# Patient Record
Sex: MALE | Race: WHITE | NOT HISPANIC OR LATINO | Employment: FULL TIME | URBAN - METROPOLITAN AREA
[De-identification: names, ages, dates, MRNs, and addresses within clinical notes are randomized per-mention and may not be internally consistent; named-entity substitution may affect disease eponyms.]

---

## 2023-10-25 ENCOUNTER — OFFICE VISIT (OUTPATIENT)
Dept: PRIMARY CARE | Facility: CLINIC | Age: 58
End: 2023-10-25
Payer: COMMERCIAL

## 2023-10-25 VITALS
BODY MASS INDEX: 30.32 KG/M2 | OXYGEN SATURATION: 98 % | SYSTOLIC BLOOD PRESSURE: 124 MMHG | HEART RATE: 91 BPM | DIASTOLIC BLOOD PRESSURE: 80 MMHG | HEIGHT: 65 IN | TEMPERATURE: 97.9 F | WEIGHT: 182 LBS

## 2023-10-25 DIAGNOSIS — E66.09 CLASS 1 OBESITY DUE TO EXCESS CALORIES WITHOUT SERIOUS COMORBIDITY WITH BODY MASS INDEX (BMI) OF 30.0 TO 30.9 IN ADULT: ICD-10-CM

## 2023-10-25 DIAGNOSIS — N40.1 BPH ASSOCIATED WITH NOCTURIA: ICD-10-CM

## 2023-10-25 DIAGNOSIS — R73.03 PREDIABETES: ICD-10-CM

## 2023-10-25 DIAGNOSIS — R35.1 BPH ASSOCIATED WITH NOCTURIA: ICD-10-CM

## 2023-10-25 DIAGNOSIS — R79.89 LOW TESTOSTERONE IN MALE: ICD-10-CM

## 2023-10-25 DIAGNOSIS — Z00.00 ENCOUNTER FOR WELLNESS EXAMINATION: Primary | ICD-10-CM

## 2023-10-25 PROBLEM — F43.22 ADJUSTMENT DISORDER WITH ANXIETY: Status: ACTIVE | Noted: 2023-10-25

## 2023-10-25 PROBLEM — E66.811 OBESITY, CLASS I, BMI 30-34.9: Status: ACTIVE | Noted: 2018-09-07

## 2023-10-25 PROBLEM — E66.9 OBESITY, CLASS I, BMI 30-34.9: Status: ACTIVE | Noted: 2018-09-07

## 2023-10-25 PROBLEM — G47.33 OBSTRUCTIVE SLEEP APNEA: Status: ACTIVE | Noted: 2023-10-25

## 2023-10-25 PROCEDURE — 99396 PREV VISIT EST AGE 40-64: CPT | Performed by: INTERNAL MEDICINE

## 2023-10-25 PROCEDURE — 3008F BODY MASS INDEX DOCD: CPT | Performed by: INTERNAL MEDICINE

## 2023-10-25 RX ORDER — DULOXETIN HYDROCHLORIDE 30 MG/1
60 CAPSULE, DELAYED RELEASE ORAL EVERY MORNING
COMMUNITY
Start: 2023-10-08 | End: 2024-02-27 | Stop reason: WASHOUT

## 2023-10-25 RX ORDER — TAMSULOSIN HYDROCHLORIDE 0.4 MG/1
CAPSULE ORAL
COMMUNITY
Start: 2017-03-21 | End: 2024-02-27 | Stop reason: SDUPTHER

## 2023-10-25 RX ORDER — DEXTROAMPHETAMINE SACCHARATE, AMPHETAMINE ASPARTATE MONOHYDRATE, DEXTROAMPHETAMINE SULFATE AND AMPHETAMINE SULFATE 5; 5; 5; 5 MG/1; MG/1; MG/1; MG/1
CAPSULE, EXTENDED RELEASE ORAL
COMMUNITY
Start: 2023-10-04

## 2023-10-25 NOTE — PROGRESS NOTES
"Subjective   Yonis Murrell is a 58 y.o. male who presents for Annual Exam.  HPI    Continues on tamsulosin, typically up once per night; STABLE, and satisfactory.      Obstructive sleep apnea, Dr. Murtaza Ladd, now on CPAP, has significant improvement with his mood, his energy during the day, wakes up feeling more rested. Been using CPAP regularly since September 2015.     Unfortunately, father (who had PD) fell down the stairs and suffered multiple rib fractures, succumbing to his injuries in AUG 2023.  Going through grief.  Also daughter is in [place] adding to stressors.     , 3 kids doing well.   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  Oldest daughter in [place], second daughter  and living in [place], son going to [employer] [facility].  No EtOH, tobacco.     Providers:  GI-Dr. Frankel  Psychiatry-Dr. Jeremias Rodriguez  Dermatology-Dr. Vieyra, not recently   Objective   /80   Pulse 91   Temp 36.6 °C (97.9 °F)   Ht 1.651 m (5' 5\")   Wt 82.6 kg (182 lb)   SpO2 98%   BMI 30.29 kg/m²    Physical Exam  Gen: NAD, pleasant, A&;Ox3  HEENT: PERRL, EOMI, MMM, OP clear  Neck: supple, no thyromegaly, no JVD, normal carotid upstroke  Pulm: lungs CTAB, good air movement  CV: RRR, no m/r/g, 2+ DP pulses  Abd: NABS, soft, NT, ND no HSM  Ext: no peripheral edema  Neuro: CN II-XII intact, no focal sensory or motor deficits, normal reflexes    Assessment/Plan     CV:  -EKG is reassuring, low LDL measured 7/28/2022, no concerning family or personal history  -continue working on diet and exercise     Psych: anxiety and depression  - seeing Dr Jeremias Rodriguez, psychiatry  - seeing Sukhdev Owen, therapy  - planning on CBT-[facility] therapy at [facility]  - med rec updated     MALA/History of hypertension: Off meds, no hypertension since being on CPAP; using regularly, helps with sleep quality and energy, continue     BPH: cont Flomax, return to Urology prn; low T on recent labs, recheck; no symptoms of decreased libido or ED     Health maintenance  - recheck " metabolics, has IFG  -Last colonoscopy: 12/3/2020, normal; repeat 5 years (hx of polyps)  - PSA: 3.75ng/mL JUL 2022  -Smoking history: Never  -Counseled regarding diet and exercise, patient has relatively good at home, discussed portion control, increase exercise  -Immunizations: current  -Followup in one year or as needed  Problem List Items Addressed This Visit    None           Rocky Orozco MD

## 2023-11-03 ENCOUNTER — LAB (OUTPATIENT)
Dept: LAB | Facility: LAB | Age: 58
End: 2023-11-03
Payer: COMMERCIAL

## 2023-11-03 DIAGNOSIS — Z00.00 ENCOUNTER FOR WELLNESS EXAMINATION: ICD-10-CM

## 2023-11-03 DIAGNOSIS — R73.03 PREDIABETES: ICD-10-CM

## 2023-11-03 DIAGNOSIS — R35.1 BPH ASSOCIATED WITH NOCTURIA: ICD-10-CM

## 2023-11-03 DIAGNOSIS — R79.89 LOW TESTOSTERONE IN MALE: ICD-10-CM

## 2023-11-03 DIAGNOSIS — N40.1 BPH ASSOCIATED WITH NOCTURIA: ICD-10-CM

## 2023-11-03 LAB
ALBUMIN SERPL BCP-MCNC: 4.2 G/DL (ref 3.4–5)
ALP SERPL-CCNC: 96 U/L (ref 33–120)
ALT SERPL W P-5'-P-CCNC: 41 U/L (ref 10–52)
ANION GAP SERPL CALC-SCNC: 11 MMOL/L (ref 10–20)
AST SERPL W P-5'-P-CCNC: 29 U/L (ref 9–39)
BILIRUB SERPL-MCNC: 1.1 MG/DL (ref 0–1.2)
BUN SERPL-MCNC: 13 MG/DL (ref 6–23)
CALCIUM SERPL-MCNC: 9.5 MG/DL (ref 8.6–10.6)
CHLORIDE SERPL-SCNC: 102 MMOL/L (ref 98–107)
CHOLEST SERPL-MCNC: 127 MG/DL (ref 0–199)
CHOLESTEROL/HDL RATIO: 4.5
CO2 SERPL-SCNC: 29 MMOL/L (ref 21–32)
CREAT SERPL-MCNC: 0.68 MG/DL (ref 0.5–1.3)
ERYTHROCYTE [DISTWIDTH] IN BLOOD BY AUTOMATED COUNT: 12.9 % (ref 11.5–14.5)
EST. AVERAGE GLUCOSE BLD GHB EST-MCNC: 197 MG/DL
GFR SERPL CREATININE-BSD FRML MDRD: >90 ML/MIN/1.73M*2
GLUCOSE SERPL-MCNC: 234 MG/DL (ref 74–99)
HBA1C MFR BLD: 8.5 %
HCT VFR BLD AUTO: 42.3 % (ref 41–52)
HDLC SERPL-MCNC: 28.1 MG/DL
HGB BLD-MCNC: 15 G/DL (ref 13.5–17.5)
LDLC SERPL CALC-MCNC: 57 MG/DL
MCH RBC QN AUTO: 29.2 PG (ref 26–34)
MCHC RBC AUTO-ENTMCNC: 35.5 G/DL (ref 32–36)
MCV RBC AUTO: 83 FL (ref 80–100)
NON HDL CHOLESTEROL: 99 MG/DL (ref 0–149)
NRBC BLD-RTO: 0 /100 WBCS (ref 0–0)
PLATELET # BLD AUTO: 196 X10*3/UL (ref 150–450)
POTASSIUM SERPL-SCNC: 4 MMOL/L (ref 3.5–5.3)
PROT SERPL-MCNC: 6.7 G/DL (ref 6.4–8.2)
PSA SERPL-MCNC: 3.17 NG/ML
RBC # BLD AUTO: 5.13 X10*6/UL (ref 4.5–5.9)
SODIUM SERPL-SCNC: 138 MMOL/L (ref 136–145)
TESTOST SERPL-MCNC: 246 NG/DL (ref 240–1000)
TRIGL SERPL-MCNC: 208 MG/DL (ref 0–149)
VLDL: 42 MG/DL (ref 0–40)
WBC # BLD AUTO: 6.5 X10*3/UL (ref 4.4–11.3)

## 2023-11-03 PROCEDURE — 36415 COLL VENOUS BLD VENIPUNCTURE: CPT

## 2023-11-03 PROCEDURE — 80061 LIPID PANEL: CPT

## 2023-11-03 PROCEDURE — 84153 ASSAY OF PSA TOTAL: CPT

## 2023-11-03 PROCEDURE — 83036 HEMOGLOBIN GLYCOSYLATED A1C: CPT

## 2023-11-03 PROCEDURE — 84403 ASSAY OF TOTAL TESTOSTERONE: CPT

## 2023-11-03 PROCEDURE — 85027 COMPLETE CBC AUTOMATED: CPT

## 2023-11-03 PROCEDURE — 80053 COMPREHEN METABOLIC PANEL: CPT

## 2023-12-07 ENCOUNTER — HOSPITAL ENCOUNTER (OUTPATIENT)
Dept: RADIOLOGY | Facility: HOSPITAL | Age: 58
Discharge: HOME | End: 2023-12-07
Payer: COMMERCIAL

## 2023-12-07 ENCOUNTER — OFFICE VISIT (OUTPATIENT)
Dept: ORTHOPEDIC SURGERY | Facility: HOSPITAL | Age: 58
End: 2023-12-07
Payer: COMMERCIAL

## 2023-12-07 ENCOUNTER — APPOINTMENT (OUTPATIENT)
Dept: SPORTS MEDICINE | Facility: HOSPITAL | Age: 58
End: 2023-12-07
Payer: COMMERCIAL

## 2023-12-07 DIAGNOSIS — M79.641 RIGHT HAND PAIN: ICD-10-CM

## 2023-12-07 DIAGNOSIS — M77.8 TENDINITIS OF EXTENSOR TENDON OF RIGHT HAND: ICD-10-CM

## 2023-12-07 PROCEDURE — 73130 X-RAY EXAM OF HAND: CPT | Mod: RT

## 2023-12-07 PROCEDURE — 99214 OFFICE O/P EST MOD 30 MIN: CPT | Performed by: EMERGENCY MEDICINE

## 2023-12-07 PROCEDURE — 73130 X-RAY EXAM OF HAND: CPT | Mod: RIGHT SIDE | Performed by: RADIOLOGY

## 2023-12-07 PROCEDURE — 99204 OFFICE O/P NEW MOD 45 MIN: CPT | Performed by: EMERGENCY MEDICINE

## 2023-12-07 PROCEDURE — 3008F BODY MASS INDEX DOCD: CPT | Performed by: EMERGENCY MEDICINE

## 2023-12-07 RX ORDER — MELOXICAM 7.5 MG/1
7.5 TABLET ORAL 2 TIMES DAILY
Qty: 28 TABLET | Refills: 0 | Status: SHIPPED | OUTPATIENT
Start: 2023-12-07 | End: 2023-12-21

## 2023-12-07 NOTE — PROGRESS NOTES
History of Present Illness:  Encounter date: 12/07/2023  Yonis Murrell is a 58 y.o. RHD male who presents today in injury clinic for evaluation of right hand pain. He states his hand was accidentally shut in a house door on 12/1/2023 and states he had subsequent pain and swelling. He applied ice and rested with improvement of swelling and pain, then noticed pain worsening with work as he is frequently typing. States pain is over dorsal aspect of third MCP of right hand and worse with finger extension and . Denies any redness, warmth, radiation of pain, numbness, tingling, weakness, or catching/locking. Denies previous injury to this area. Denies other concerns at this time.    Review of Systems  Constitutional: no fever, no chills, not feeling tired, no recent weight gain and no recent weight loss.   ENT: no nosebleeds.   Cardiovascular: no chest pain.   Respiratory: no shortness of breath and no cough.   Gastrointestinal: no abdominal pain, no nausea, no diarrhea and no vomiting.   Musculoskeletal: as noted in HPI and no arthralgias.   Integumentary: no rashes and no skin wound.   Neurological: no headache.   Psychiatric: no sleep disturbances and no depression.   Endocrine: no muscle weakness and no muscle cramps.   Hematologic/Lymphatic: no swollen glands and no tendency for easy bruising.    Physical Exam:  RIGHT HAND EXAM    Inspection:  Swelling: mild over dorsal third MCP  Effusion: none  Deformity rotational phalanges/metacarpals: none  Deformity angular phalanges/metacarpals: none  Thenar atrophy: none  Hypothernar atrophy: none    ROM:  PIP joints: full, pain free   DIP joints: full, pain free   MCP joints: full, pain free   IP joint thumb: full, pain free     Palpation:  TTP Distal phalanges No  TTP Middle phalanges No  TTP Proximal phalanges No  TTP Metacarpals No  TTP Scaphoid No  TTP Lunate No  TTP PIP joints No  TTP DIP joints No  TTP MCP joints No  TTP IP joint thumb No    TTP Extensor tendons  wrist No  TTP Flexor tendons of wrist No    Strength  Flexion PIPs pain free, 5/5  Flexion DIPs pain free, 5/5   Flexion MCPs pain free  5/5 in digits 1-2 and 4-5,. Third MCP with mild pain, 5/5 strength.  Flexion thumb IP pain free, 5/5    Extension PIPs pain free, 5/5 in digits 1-2 and 4-5. Third PIP with pain and 4/5 strength.  Extension DIPs pain free, 5/5   Extension MCPs pain free, 5/5 in digits 1-2 and 4-5. Third MCP with pain and 4/5 strength.  Extension thumb IP pain free, 5/5    Can do “OK” sign  Thumb Extension 5/5  Interosseous muscle testing pain free, 5/5  Wrist extension pain free, 5/5  Wrist flexion pain free, 5/5  Pronation forearm pain free, 5/5  Supination forearm 5/5, no pain     Ligament and special testing:   Collateral ligament testing: No Pain/laxity with PIP, DIP collateral ligament of fingers  Jerson's maneuver (extension PIP joint against resistance): negative    Constitutional: General appearance = Alert and in no acute distress. Well developed, well nourished.   Head and face: Normal.    Eyes: External Eye, Conjunctiva and Lids=Normal external exam; and extraocular movements intact (EOMI).   Ears, Nose, Mouth, and Throat: External inspection of ears and nose=Normal.   Hearing= Normal.    Neck: No neck mass was observed.  Pulmonary: Respiratory effort = No respiratory distress.   Cardiovascular: Examination of extremities= No peripheral edema.   Skin and subcutaneous tissue: Normal skin color and pigmentation, normal skin turgor, and no rash.    Neurologic: Sensation= Normal.    Psychiatric: Judgment and insight= Intact.  Orientation to person, place, and time: Alert and oriented x 3.  Mood and affect= Normal.    Imaging:   Radiographs of the right hand obtained today were reviewed and revealed no acute osseous abnormalities.  The studies were reviewed with Dr. Hidalgo personally in the office today.    Problem List Items Addressed This Visit    None  Visit Diagnoses         Codes    Right  hand pain     M79.641    Relevant Orders    XR hand right 3+ views (Completed)    Tendinitis of extensor tendon of right hand     M77.8    Relevant Medications    meloxicam (Mobic) 7.5 mg tablet    Other Relevant Orders    Referral to Occupational Therapy        Patient Discussion/Summary  We reviewed the exam and x-ray findings and discussed the conservative and surgical treatment options. We agreed to conservative management of right hand, third digit extensor tendinitis. Prescribed meloxicam 7.5 mg to be taken twice daily for 14 days. OT referral also provided for further strengthening/ROM exercises of right hand. May use buddy tape as needed. Activity modification discussed and encouraged to continue ice and rest as needed. May follow-up in clinic in 2-4 weeks if no improvement or sooner as needed. Discussed this plan with patient who is understanding and agreeable.    **This note was dictated using Dragon speech recognition software and was not corrected for spelling or grammatical errors**

## 2023-12-22 ENCOUNTER — OFFICE VISIT (OUTPATIENT)
Dept: PRIMARY CARE | Facility: CLINIC | Age: 58
End: 2023-12-22
Payer: COMMERCIAL

## 2023-12-22 VITALS
TEMPERATURE: 97.3 F | BODY MASS INDEX: 29.62 KG/M2 | WEIGHT: 178 LBS | DIASTOLIC BLOOD PRESSURE: 73 MMHG | HEART RATE: 73 BPM | OXYGEN SATURATION: 97 % | SYSTOLIC BLOOD PRESSURE: 123 MMHG

## 2023-12-22 DIAGNOSIS — E11.9 TYPE 2 DIABETES MELLITUS WITHOUT COMPLICATION, WITHOUT LONG-TERM CURRENT USE OF INSULIN (MULTI): Primary | ICD-10-CM

## 2023-12-22 DIAGNOSIS — R79.89 LOW TESTOSTERONE IN MALE: ICD-10-CM

## 2023-12-22 PROBLEM — R73.03 PREDIABETES: Status: RESOLVED | Noted: 2023-10-25 | Resolved: 2023-12-22

## 2023-12-22 PROBLEM — E66.811 CLASS 1 OBESITY DUE TO EXCESS CALORIES WITHOUT SERIOUS COMORBIDITY WITH BODY MASS INDEX (BMI) OF 30.0 TO 30.9 IN ADULT: Status: RESOLVED | Noted: 2018-09-07 | Resolved: 2023-12-22

## 2023-12-22 PROBLEM — E66.09 CLASS 1 OBESITY DUE TO EXCESS CALORIES WITHOUT SERIOUS COMORBIDITY WITH BODY MASS INDEX (BMI) OF 30.0 TO 30.9 IN ADULT: Status: RESOLVED | Noted: 2018-09-07 | Resolved: 2023-12-22

## 2023-12-22 PROCEDURE — 3078F DIAST BP <80 MM HG: CPT | Performed by: INTERNAL MEDICINE

## 2023-12-22 PROCEDURE — 3074F SYST BP LT 130 MM HG: CPT | Performed by: INTERNAL MEDICINE

## 2023-12-22 PROCEDURE — 99215 OFFICE O/P EST HI 40 MIN: CPT | Performed by: INTERNAL MEDICINE

## 2023-12-22 PROCEDURE — 95251 CONT GLUC MNTR ANALYSIS I&R: CPT | Performed by: INTERNAL MEDICINE

## 2023-12-22 PROCEDURE — 3008F BODY MASS INDEX DOCD: CPT | Performed by: INTERNAL MEDICINE

## 2023-12-22 PROCEDURE — 3048F LDL-C <100 MG/DL: CPT | Performed by: INTERNAL MEDICINE

## 2023-12-22 PROCEDURE — 3052F HG A1C>EQUAL 8.0%<EQUAL 9.0%: CPT | Performed by: INTERNAL MEDICINE

## 2023-12-22 RX ORDER — BLOOD-GLUCOSE SENSOR
EACH MISCELLANEOUS
Qty: 1 EACH | Refills: 0 | COMMUNITY
Start: 2023-12-22 | End: 2024-01-03 | Stop reason: SDUPTHER

## 2023-12-22 NOTE — PROGRESS NOTES
Subjective   Yonis Murrell is a 58 y.o. male who presents for Follow-up.  HPI    Past medical history includes diabetes, anxiety/adjustment disorder and MALA.    He is here accompanied by his wife to discuss his recent diabetes diagnosis.  They also both recently had COVID, recovering.    Previously had prediabetes, on lab work in early November his A1c showed overt diabetes with a hemoglobin A1c of 8.5%.  Recall he was going through a lot of stressors and grief after the death of his father, eating and exercising more far from ideal.  He has started limiting carbohydrates and wants to work on lifestyle modifications first prior to moving to medications for diabetes.    He has no vision changes or blurriness, no numbness or tingling in the extremities, no polyuria or polydipsia.    Extensive discussion about pathogenesis and natural history of diabetes and approaches to diet and exercise to help limit progression or reverse the process.  We also discussed monitoring with a CGM.    He also had borderline low testosterone and was concerned about relationship to poor energy and mood, discussed this with his psychiatrist as well.    **COPIED FORWARD FOR REFERENCE**    Continues on tamsulosin, typically up once per night; STABLE, and satisfactory.      Obstructive sleep apnea, Dr. Murtaza Ladd, now on CPAP, has significant improvement with his mood, his energy during the day, wakes up feeling more rested. Been using CPAP regularly since September 2015.      , 3 kids doing well.   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  Oldest daughter in [place], second daughter  and living in [facility], son going to [facility] [employer].  No EtOH, tobacco.     Providers:  GI-Dr. Frankel  Psychiatry-Dr. Jeremias Rodriguez  Dermatology-Dr. Vieyra, not recently   Objective   /73   Pulse 73   Temp 36.3 °C (97.3 °F)   Wt 80.7 kg (178 lb)   SpO2 97%   BMI 29.62 kg/m²    Physical Exam  Gen: NAD, pleasant, A&;Ox3  HEENT: PERRL, EOMI, MMM, OP clear  Neck:  "supple, no thyromegaly, no JVD, normal carotid upstroke  Pulm: lungs CTAB, good air movement  CV: RRR, no m/r/g, 2+ DP pulses  Abd: NABS, soft, NT, ND no HSM  Ext: no peripheral edema  Neuro: CN II-XII intact, no focal sensory or motor deficits, normal reflexes    Lab Results   Component Value Date    HGBA1C 8.5 (H) 11/03/2023      No results found for: \"ALBUR\", \"[id number]\"   Lab Results   Component Value Date    CHOL 127 11/03/2023     Lab Results   Component Value Date    HDL 28.1 11/03/2023     Lab Results   Component Value Date    LDLCALC 57 11/03/2023     Lab Results   Component Value Date    TRIG 208 (H) 11/03/2023     No components found for: \"CHOLHDL\"  Lab Results   Component Value Date    CREATININE 0.68 11/03/2023          Assessment/Plan     NIDDM: Hemoglobin A1c 8.5%, 11/3/2023  -Prefers attempt at lifestyle modifications which I am completely in support of  -Extensive discussion as above and referral to RD  -CGM sample was provided and helped apply and initiate, Lib[employer] invitation sent and will be monitored  - start statin if remains in diabetic range  - follow-up in 2-3 months    Low testosterone:  discussed relationship to weight and metabolism as well; since I would anyway confirm with a repeat test and recommend working on lifestyle modifications, we will have him do both when we recheck A1c in a few months    **COPIED FORWARD FOR REFERENCE**  CV:  -EKG is reassuring, low LDL measured 7/28/2022, no concerning family or personal history  -continue working on diet and exercise  -Discuss statin treatment given diabetes    Psych: anxiety and depression  - seeing Dr Jeremias Rodriguez, psychiatry  - seeing Sukhdev Owen, therapy  - planning on CBT[facility] therapy at [facility]  - med rec updated     MALA/History of hypertension: Off meds, no hypertension since being on CPAP; using regularly, helps with sleep quality and energy, continue     BPH: cont Flomax, return to Urology prn; low T on recent labs, recheck; no " symptoms of decreased libido or ED     Health maintenance  - recheck metabolics, has IFG  -Last colonoscopy: 12/3/2020, normal; repeat 5 years (hx of polyps)  - PSA: 3.75ng/mL JUL 2022  -Smoking history: Never  -Counseled regarding diet and exercise, patient has relatively good at home, discussed portion control, increase exercise  -Immunizations: current  -Followup in one year or as needed  Problem List Items Addressed This Visit    None  Visit Diagnoses       Type 2 diabetes mellitus without complication, without long-term current use of insulin (CMS/[facility])    -  Primary    Relevant Orders    Referral to Nutrition Services                   Rocky Orozco MD

## 2024-01-03 DIAGNOSIS — E11.9 TYPE 2 DIABETES MELLITUS WITHOUT COMPLICATION, WITHOUT LONG-TERM CURRENT USE OF INSULIN (MULTI): ICD-10-CM

## 2024-01-03 RX ORDER — BLOOD-GLUCOSE SENSOR
EACH MISCELLANEOUS
Qty: 4 EACH | Refills: 3 | Status: SHIPPED | OUTPATIENT
Start: 2024-01-03 | End: 2024-05-29 | Stop reason: SDUPTHER

## 2024-01-22 ENCOUNTER — PATIENT MESSAGE (OUTPATIENT)
Dept: PRIMARY CARE | Facility: CLINIC | Age: 59
End: 2024-01-22
Payer: COMMERCIAL

## 2024-01-22 DIAGNOSIS — E11.9 TYPE 2 DIABETES MELLITUS WITHOUT COMPLICATION, WITHOUT LONG-TERM CURRENT USE OF INSULIN (MULTI): Primary | ICD-10-CM

## 2024-01-23 ENCOUNTER — APPOINTMENT (OUTPATIENT)
Dept: PRIMARY CARE | Facility: CLINIC | Age: 59
End: 2024-01-23
Payer: COMMERCIAL

## 2024-01-23 ENCOUNTER — TELEMEDICINE (OUTPATIENT)
Dept: PRIMARY CARE | Facility: CLINIC | Age: 59
End: 2024-01-23
Payer: COMMERCIAL

## 2024-01-23 DIAGNOSIS — J40 BRONCHITIS: Primary | ICD-10-CM

## 2024-01-23 DIAGNOSIS — R05.1 ACUTE COUGH: ICD-10-CM

## 2024-01-23 PROCEDURE — 99202 OFFICE O/P NEW SF 15 MIN: CPT | Performed by: NURSE PRACTITIONER

## 2024-01-23 RX ORDER — METHYLPREDNISOLONE 4 MG/1
TABLET ORAL
Qty: 21 TABLET | Refills: 0 | Status: SHIPPED | OUTPATIENT
Start: 2024-01-23 | End: 2024-01-30

## 2024-01-23 RX ORDER — METFORMIN HYDROCHLORIDE 500 MG/1
500 TABLET, EXTENDED RELEASE ORAL
Qty: 60 TABLET | Refills: 1 | Status: SHIPPED | OUTPATIENT
Start: 2024-01-23 | End: 2024-02-15

## 2024-01-23 RX ORDER — BENZONATATE 200 MG/1
200 CAPSULE ORAL 3 TIMES DAILY PRN
Qty: 42 CAPSULE | Refills: 0 | Status: SHIPPED | OUTPATIENT
Start: 2024-01-23 | End: 2024-02-22

## 2024-01-23 ASSESSMENT — ENCOUNTER SYMPTOMS: COUGH: 1

## 2024-01-23 NOTE — PROGRESS NOTES
Subjective   Patient ID: Yonis Murrell is a 58 y.o. male who presents for Cough and Chest Pain.    Cough  This is a new problem. The current episode started in the past 7 days. The problem has been gradually worsening. The problem occurs every few minutes. The cough is Productive of sputum. Associated symptoms include postnasal drip. Associated symptoms comments: Chest feels tight. The symptoms are aggravated by cold air. Treatments tried: otc cold remedy. The treatment provided no relief.    Reports unable to get oob Saturday due to cough, feeling generally run down. Back to work today however cough continues, noted high pitch on virtual visit, chest feels tight although denies chopra or sob, no history of asthma or COPD, endorses apnea. Cough is keeping him awake at night  Does not appear toxic    Review of Systems   HENT:  Positive for postnasal drip.    Respiratory:  Positive for cough.        Objective   There were no vitals taken for this visit.    Physical Exam  Not performed virtual visit    Assessment/Plan   Problem List Items Addressed This Visit             ICD-10-CM    Bronchitis - Primary J40    Relevant Medications    benzonatate (Tessalon) 200 mg capsule    methylPREDNISolone (Medrol Dospak) 4 mg tablets    Acute cough R05.1    Relevant Medications    benzonatate (Tessalon) 200 mg capsule    methylPREDNISolone (Medrol Dospak) 4 mg tablets

## 2024-01-26 ENCOUNTER — OFFICE VISIT (OUTPATIENT)
Dept: PRIMARY CARE | Facility: CLINIC | Age: 59
End: 2024-01-26
Payer: COMMERCIAL

## 2024-01-26 VITALS
DIASTOLIC BLOOD PRESSURE: 72 MMHG | TEMPERATURE: 98.2 F | WEIGHT: 174 LBS | HEART RATE: 87 BPM | HEIGHT: 65 IN | OXYGEN SATURATION: 96 % | BODY MASS INDEX: 28.99 KG/M2 | SYSTOLIC BLOOD PRESSURE: 120 MMHG

## 2024-01-26 DIAGNOSIS — H66.002 NON-RECURRENT ACUTE SUPPURATIVE OTITIS MEDIA OF LEFT EAR WITHOUT SPONTANEOUS RUPTURE OF TYMPANIC MEMBRANE: ICD-10-CM

## 2024-01-26 DIAGNOSIS — J06.9 VIRAL UPPER RESPIRATORY TRACT INFECTION: Primary | ICD-10-CM

## 2024-01-26 PROCEDURE — 99214 OFFICE O/P EST MOD 30 MIN: CPT | Performed by: INTERNAL MEDICINE

## 2024-01-26 PROCEDURE — 3008F BODY MASS INDEX DOCD: CPT | Performed by: INTERNAL MEDICINE

## 2024-01-26 RX ORDER — AMOXICILLIN AND CLAVULANATE POTASSIUM 875; 125 MG/1; MG/1
875 TABLET, FILM COATED ORAL 2 TIMES DAILY
Qty: 10 TABLET | Refills: 0 | Status: SHIPPED | OUTPATIENT
Start: 2024-01-26 | End: 2024-01-31

## 2024-01-26 NOTE — PATIENT INSTRUCTIONS
Twice daily, in each nostril, spray 1-2 doses of Afrin (oxymetazoline) followed by nasal sinus wash (squeeze bottle or Ly pot, i.e. NeilMed) followed by 1 spray in each nostril of nasal inhaled corticosteroid (i.e. Flonase, Nasacort, fluticasone, mometasone etc.)  After the first 3 days, STOP Afrin and continue the sinus wash and nasal steroid for at least 1-2 weeks and until symptoms are improved.     Mucinex/Robitussin/Corcidin (regular or DM) for cough.

## 2024-01-26 NOTE — PROGRESS NOTES
"Subjective   Yonis Murrell is a 58 y.o. male who presents for Cough.  HPI  Started about 10 days ago, initially with a mild cough, scratchy throat, nonproductive but progressed to increasing cough with increasing sputum production mostly in the throat, cough described as a \"trip\".  Tried NyQuil with no benefit.  Called telemedicine CNP on 1/23/2024 who prescribed methylprednisolone and benzonatate.  No significant benefit, did notice increased phlegm production at first followed by overall decreased phlegm.  Blood sugars shot up significantly after starting the steroid.  He is concerned because his cough seems to be slightly lower in the chest and he is also now having sinus pressure and ear pressure and increased congestion in general as well as headaches.  Also not sleeping well.  No fevers chills or sweats  He is experiencing headaches  No shortness of breath but has some difficulty taking deep breaths.    Objective   /72   Pulse 87   Temp 36.8 °C (98.2 °F)   Ht 1.651 m (5' 5\")   Wt 78.9 kg (174 lb)   SpO2 96%   BMI 28.96 kg/m²    Physical Exam  NAD, frequent nonproductive moist cough  Moist mucous membranes, there is a slight lesion overlying the uvula  Otic canals are clear bilaterally, left tympanic membrane is erythematous without bulging, intact and without suppurative exudate  No lymphadenopathy  Lungs are clear to auscultation, some scattered rales which clear with cough  Regular rate and rhythm    Assessment/Plan     Likely viral URI with persistent cough due to postnasal drip.  Appears to possibly be developing a secondary left otitis media.  Almost completed with taper of Solu-Medrol which I would not use in the future unless absolutely necessary, questionable benefit in this case.  Stop benzonatate, no benefit in the setting of mucus production.  Prescribe Augmentin for otitis media although he may be able to clear before he gets worse with a aggressive nasal/sinus regimen as per patient " instructions.  Problem List Items Addressed This Visit    None           Rocky Orozco MD

## 2024-02-02 ENCOUNTER — LAB (OUTPATIENT)
Dept: LAB | Facility: LAB | Age: 59
End: 2024-02-02
Payer: COMMERCIAL

## 2024-02-02 DIAGNOSIS — F41.1 GENERALIZED ANXIETY DISORDER: ICD-10-CM

## 2024-02-02 DIAGNOSIS — Z79.899 OTHER LONG TERM (CURRENT) DRUG THERAPY: Primary | ICD-10-CM

## 2024-02-02 DIAGNOSIS — F90.0 ATTENTION-DEFICIT HYPERACTIVITY DISORDER, PREDOMINANTLY INATTENTIVE TYPE: ICD-10-CM

## 2024-02-02 DIAGNOSIS — F33.1 MAJOR DEPRESSIVE DISORDER, RECURRENT, MODERATE (MULTI): ICD-10-CM

## 2024-02-02 LAB
ANION GAP SERPL CALC-SCNC: 14 MMOL/L (ref 10–20)
BUN SERPL-MCNC: 16 MG/DL (ref 6–23)
CALCIUM SERPL-MCNC: 9.2 MG/DL (ref 8.6–10.3)
CHLORIDE SERPL-SCNC: 102 MMOL/L (ref 98–107)
CO2 SERPL-SCNC: 26 MMOL/L (ref 21–32)
CREAT SERPL-MCNC: 0.68 MG/DL (ref 0.5–1.3)
EGFRCR SERPLBLD CKD-EPI 2021: >90 ML/MIN/1.73M*2
GLUCOSE SERPL-MCNC: 207 MG/DL (ref 74–99)
POTASSIUM SERPL-SCNC: 3.7 MMOL/L (ref 3.5–5.3)
SODIUM SERPL-SCNC: 138 MMOL/L (ref 136–145)
TSH SERPL DL<=0.05 MIU/L-ACNC: 1.01 MIU/L (ref 0.27–4.2)

## 2024-02-02 PROCEDURE — 84443 ASSAY THYROID STIM HORMONE: CPT

## 2024-02-02 PROCEDURE — 80048 BASIC METABOLIC PNL TOTAL CA: CPT

## 2024-02-02 PROCEDURE — 36415 COLL VENOUS BLD VENIPUNCTURE: CPT

## 2024-02-10 ENCOUNTER — LAB (OUTPATIENT)
Dept: LAB | Facility: LAB | Age: 59
End: 2024-02-10
Payer: COMMERCIAL

## 2024-02-10 DIAGNOSIS — Z79.899 OTHER LONG TERM (CURRENT) DRUG THERAPY: Primary | ICD-10-CM

## 2024-02-10 LAB
ANION GAP SERPL CALC-SCNC: 12 MMOL/L (ref 10–20)
BUN SERPL-MCNC: 15 MG/DL (ref 6–23)
CALCIUM SERPL-MCNC: 9.2 MG/DL (ref 8.6–10.3)
CHLORIDE SERPL-SCNC: 104 MMOL/L (ref 98–107)
CO2 SERPL-SCNC: 26 MMOL/L (ref 21–32)
CREAT SERPL-MCNC: 0.71 MG/DL (ref 0.5–1.3)
EGFRCR SERPLBLD CKD-EPI 2021: >90 ML/MIN/1.73M*2
GLUCOSE SERPL-MCNC: 134 MG/DL (ref 74–99)
LITHIUM SERPL-SCNC: 0.16 MMOL/L (ref 0.6–1.2)
POTASSIUM SERPL-SCNC: 3.9 MMOL/L (ref 3.5–5.3)
SODIUM SERPL-SCNC: 138 MMOL/L (ref 136–145)
TSH SERPL-ACNC: 1.69 MIU/L (ref 0.44–3.98)

## 2024-02-10 PROCEDURE — 80178 ASSAY OF LITHIUM: CPT

## 2024-02-10 PROCEDURE — 36415 COLL VENOUS BLD VENIPUNCTURE: CPT

## 2024-02-10 PROCEDURE — 80048 BASIC METABOLIC PNL TOTAL CA: CPT

## 2024-02-10 PROCEDURE — 84443 ASSAY THYROID STIM HORMONE: CPT

## 2024-02-12 ENCOUNTER — NUTRITION (OUTPATIENT)
Dept: GASTROENTEROLOGY | Facility: HOSPITAL | Age: 59
End: 2024-02-12
Payer: COMMERCIAL

## 2024-02-12 VITALS — HEIGHT: 65 IN | BODY MASS INDEX: 28.96 KG/M2

## 2024-02-12 DIAGNOSIS — E11.9 TYPE 2 DIABETES MELLITUS WITHOUT COMPLICATION, WITHOUT LONG-TERM CURRENT USE OF INSULIN (MULTI): ICD-10-CM

## 2024-02-12 PROCEDURE — 97802 MEDICAL NUTRITION INDIV IN: CPT

## 2024-02-12 NOTE — PROGRESS NOTES
Nutrition: Initial Assessment    Reason for Nutrition Visit: Patient is a 58 y.o. male referred for T2DM. Referred on 12/22/23 by Dr. Rocky Orozco.     Nutrition Assessment    Food and Nutrient History: Pt presents for nutrition counseling. Keeps kosher.     Dietary Recall:  Meal 1: 8-9AM: eggs  Meal 2: 1PM: did not assess  Meal 3: 7PM: did not assess     Fluid Intake: herbal tea, water, seltzer  Alcohol: 0-1 drink per week    Appetite: Normal  Food Allergy: none  Food Intolerance: some degree of lactose intolerance  GI Symptoms: none    Dentition: own  Cooking: Patient, Spouse/Significant Other  Grocery Shopping: Patient, Spouse/Significant Other  Dietary Supplements: none  Food Insecurity: Denies    Physical Activity: Joined gym and trying to do 30 mins of brisk walking     Labs:  Lab Results   Component Value Date    HGBA1C 8.5 (H) 11/03/2023    HGBA1C 6.0 (A) 07/28/2022    HGBA1C 5.8 07/22/2021     02/10/2024    K 3.9 02/10/2024     02/10/2024    CO2 26 02/10/2024    BUN 15 02/10/2024    CREATININE 0.71 02/10/2024    CALCIUM 9.2 02/10/2024    ALBUMIN 4.2 11/03/2023    PROT 6.7 11/03/2023    BILITOT 1.1 11/03/2023    ALKPHOS 96 11/03/2023    ALT 41 11/03/2023    AST 29 11/03/2023    GLUCOSE 134 (H) 02/10/2024    CHOL 127 11/03/2023    TRIG 208 (H) 11/03/2023    HDL 28.1 11/03/2023   Comments: Last A1c above target (11/3/23). High VLDL = 42, high TG = 208 (11/3/23). No recent vitamin D on file.     Diabetes:  Diagnosed end of 2023  Prior Nutrition Education: No   SMBG: Freestyle Madeleine 3   Hypoglycemia: none    Current DM Medications/Insulin Regimen:  - Metformin  mg BID    Nutrition Focused Physical Exam:    Performed/Deferred: Deferred as pt visually appears well-nourished with no signs of malnutrition    Muscle Wasting:                   Loss of Subcutaneous Fat:                Other Physical Findings:                   Past Medical History:  Patient Active Problem List   Diagnosis    BPH  "associated with nocturia    Adjustment disorder with anxiety    Obstructive sleep apnea    Bronchitis    Acute cough        Anthropometrics:  Ht Readings from Last 1 Encounters:   02/12/24 1.651 m (5' 5\")     BMI Readings from Last 1 Encounters:   02/12/24 28.96 kg/m²     Wt Readings from Last 10 Encounters:   01/26/24 78.9 kg (174 lb)   12/22/23 80.7 kg (178 lb)   10/25/23 82.6 kg (182 lb)   10/13/22 80.3 kg (177 lb)   09/14/22 80.7 kg (178 lb)   07/28/22 79.8 kg (176 lb)   07/22/21 80.3 kg (177 lb)   05/13/21 79.8 kg (175 lb 14.4 oz)   01/28/21 79.4 kg (175 lb)   08/04/20 78.5 kg (173 lb)       Estimated Energy Needs:    Total Energy Estimated Needs (kCal): 1800 kCal   Method for Estimating Needs: 1546 x 1.3 - 250 (for weight loss)   Total Protein Estimated Needs (g): 80 g   Total Protein Estimated Needs (g/kg): 1 g/kg    Nutrition Diagnosis     Patient has Malnutrition Diagnosis: No          Patient has Nutrition Diagnosis: Yes Diagnosis Status (1): New  Nutrition Diagnosis 1: Altered nutrition related to laboratory values Related to (1): DM As Evidenced by (1): A1c = 8.5% (11/3/23)                                   Nutrition Interventions/Recommendations      Meals & Snacks: Carbohydrate-modified diet, Energy-modified diet, Fiber-modified diet, Modify Composition of Meals/Snacks, General Healthful Diet, Protein-modified diet      Nutrition Education  Provided education on what happens in the body when prediabetes and diabetes develop. Explained why providing consistent amounts of carbohydrates in the diet is helpful for regulating blood sugar. Encouraged choosing foods that contain minimally processed complex carbohydrates, such as high fiber whole grains, beans, starchy vegetables, whole fruits. Simple sugars from fruit juice, sugar-sweetened beverages, and foods with added sugar should be limited in the diet. Also discussed how foods like protein, some fat, and non-starchy vegetables impact blood sugar " regulation.    Provided education on carbohydrate (carb) counting. Discussed the following:  Foods and beverages that contribute carbohydrates (fruits, grains, legumes, milk, yogurt, starchy vegetables, sugar added to foods, sugar-sweetened beverages)  Foods that contribute no or minimal carbohydrates (protein, fats, non-starchy vegetables)  How to use portions of food that are 15 grams of carbohydrate to facilitate counting; Gave examples of portions  How to read a food label to count carbohydrates  How to use tools to help with carb counting (phone apps, food scales)  Advised patient to aim for 45 grams of carbohydrate per meal and 15 grams of carbohydrate per snack    Provided education on Plate Method as a tool for preparing balanced meals. Discussed the following: Fill 1/2 plate with non-starchy vegetables (broccoli, carrots, cauliflower, salad greens, cucumbers, tomatoes). These foods contribute very few carbohydrates, and they add fiber to the meal. Fill 1/4 plate with lean protein (meat, turkey, fish, seafood, eggs, nuts, cheese, cottage cheese, nut butter, tofu, edamame). Fill 1/4 plate with carbohydrates/starches (grains, fruits, starchy vegetables, beans, yogurt, milk). Aim for at least half of daily grains as whole grains.    Reviewed 15-15 Rule for Hypoglycemia (Low Blood Sugar): If BG is less than 70, then consume 15 grams of carbohydrates (carbs) to raise your BG. Check BG again in 15 minutes. If it is still < 70 after 15 minutes, then consume an additional 15 grams of carbs.  Food and drink items that contain 15 grams of carbohydrates are the following: 3-4 glucose tablets, 4 oz. of juice or regular soda (not diet), 8 oz. of milk, 1 tbsp. of sugar, honey, or corn syrup, hard candy (check label for how much to eat). If you are experiencing symptoms of a low BG and you are not able to check your BG, treat the hypoglycemia. Symptoms of a low BG include shakiness, sweating, irritability, confusion, rapid  heartbeat, blurred vision, clamminess.    Reviewed glycemic targets and goals for CGM.      *Patient expressed understanding of the nutrition education and denied any additional questions/concerns.     Educational Handouts: ADA Hypoglycemia Handout, ADA Plate Method, NCM Carbohydrate Counting for People with Diabetes,  Carb Counting Guide     Nutrition Monitoring and Evaluation   Consistent meal/snack pattern   Intentional weight loss of 0.5-2 lb per week, trending toward a clinically significant weight loss of 5% of current body weight.  Reduced TG < 150   Blood Glucose Goals  Fasting B-130 mg/dL  Postprandial BG: less than 180 mg/dL  A1c: less than 7%  Time in range > 70%      Readiness to Change : Excellent  Level of Understanding : Excellent  Anticipated Compliant : Excellent     Follow-up: 3 months

## 2024-02-15 DIAGNOSIS — E11.9 TYPE 2 DIABETES MELLITUS WITHOUT COMPLICATION, WITHOUT LONG-TERM CURRENT USE OF INSULIN (MULTI): ICD-10-CM

## 2024-02-15 RX ORDER — METFORMIN HYDROCHLORIDE 500 MG/1
TABLET, EXTENDED RELEASE ORAL
Qty: 180 TABLET | Refills: 1 | Status: SHIPPED | OUTPATIENT
Start: 2024-02-15

## 2024-02-27 ENCOUNTER — APPOINTMENT (OUTPATIENT)
Dept: PRIMARY CARE | Facility: CLINIC | Age: 59
End: 2024-02-27
Payer: COMMERCIAL

## 2024-02-27 ENCOUNTER — OFFICE VISIT (OUTPATIENT)
Dept: PRIMARY CARE | Facility: CLINIC | Age: 59
End: 2024-02-27
Payer: COMMERCIAL

## 2024-02-27 VITALS
TEMPERATURE: 97.4 F | SYSTOLIC BLOOD PRESSURE: 95 MMHG | BODY MASS INDEX: 28.99 KG/M2 | WEIGHT: 174 LBS | HEIGHT: 65 IN | OXYGEN SATURATION: 96 % | HEART RATE: 74 BPM | DIASTOLIC BLOOD PRESSURE: 61 MMHG

## 2024-02-27 DIAGNOSIS — R79.89 LOW TESTOSTERONE IN MALE: ICD-10-CM

## 2024-02-27 DIAGNOSIS — N40.1 BPH ASSOCIATED WITH NOCTURIA: ICD-10-CM

## 2024-02-27 DIAGNOSIS — E11.9 TYPE 2 DIABETES MELLITUS WITHOUT COMPLICATION, WITHOUT LONG-TERM CURRENT USE OF INSULIN (MULTI): Primary | ICD-10-CM

## 2024-02-27 DIAGNOSIS — R35.1 BPH ASSOCIATED WITH NOCTURIA: ICD-10-CM

## 2024-02-27 PROBLEM — M72.2 BILATERAL PLANTAR FASCIITIS: Status: RESOLVED | Noted: 2024-02-27 | Resolved: 2024-02-27

## 2024-02-27 PROBLEM — R05.1 ACUTE COUGH: Status: RESOLVED | Noted: 2024-01-23 | Resolved: 2024-02-27

## 2024-02-27 PROBLEM — J40 BRONCHITIS: Status: RESOLVED | Noted: 2024-01-23 | Resolved: 2024-02-27

## 2024-02-27 LAB — HBA1C MFR BLD: 7.1 % (ref 4.2–6.5)

## 2024-02-27 PROCEDURE — 99214 OFFICE O/P EST MOD 30 MIN: CPT | Performed by: INTERNAL MEDICINE

## 2024-02-27 PROCEDURE — 3078F DIAST BP <80 MM HG: CPT | Performed by: INTERNAL MEDICINE

## 2024-02-27 PROCEDURE — 3008F BODY MASS INDEX DOCD: CPT | Performed by: INTERNAL MEDICINE

## 2024-02-27 PROCEDURE — 3051F HG A1C>EQUAL 7.0%<8.0%: CPT | Performed by: INTERNAL MEDICINE

## 2024-02-27 PROCEDURE — 83036 HEMOGLOBIN GLYCOSYLATED A1C: CPT | Mod: CLIA WAIVED TEST | Performed by: INTERNAL MEDICINE

## 2024-02-27 PROCEDURE — 3074F SYST BP LT 130 MM HG: CPT | Performed by: INTERNAL MEDICINE

## 2024-02-27 RX ORDER — ZOLPIDEM TARTRATE 5 MG/1
5 TABLET ORAL NIGHTLY PRN
COMMUNITY
Start: 2024-02-02

## 2024-02-27 RX ORDER — DULOXETIN HYDROCHLORIDE 60 MG/1
60 CAPSULE, DELAYED RELEASE ORAL EVERY MORNING
COMMUNITY
Start: 2024-02-17

## 2024-02-27 RX ORDER — TAMSULOSIN HYDROCHLORIDE 0.4 MG/1
0.4 CAPSULE ORAL DAILY
Qty: 90 CAPSULE | Refills: 3 | Status: SHIPPED | OUTPATIENT
Start: 2024-02-27 | End: 2025-02-26

## 2024-02-27 RX ORDER — LITHIUM CARBONATE 150 MG/1
150 CAPSULE ORAL
COMMUNITY
Start: 2024-02-02

## 2024-02-27 NOTE — PROGRESS NOTES
"Subjective   Yonis Murrell is a 58 y.o. male who presents for Follow-up.  HPI    Past medical history includes diabetes, anxiety/adjustment disorder and MALA.    See visit regarding URI, responded well to treatment, resolved.    Previously had prediabetes, on lab work in early November 2023 his A1c showed overt diabetes with a hemoglobin A1c of 8.5%.  Recall he was going through a lot of stressors and grief after the death of his father, eating and exercising more far from ideal.  He has started limiting carbohydrates and wants to work on lifestyle modifications first prior to moving to medications for diabetes.  Started on metformin 1/23/2024.  No adverse effects.  Met with Char, 2/12/2024, tracking carbs; reviewed CGM today.    He has no vision changes or blurriness, no numbness or tingling in the extremities, no polyuria or polydipsia.    Lithium was added by psychiatry, helping a lot with mood, irritability.  Normal renal function.    He also had borderline low testosterone and was concerned about relationship to poor energy and mood, discussed this with his psychiatrist as well.    **COPIED FORWARD FOR REFERENCE**    Continues on tamsulosin, typically up once per night; STABLE, and satisfactory.      Obstructive sleep apnea, Dr. Murtaza Ladd, now on CPAP, has significant improvement with his mood, his energy during the day, wakes up feeling more rested. Been using CPAP regularly since September 2015.      , 3 kids doing well.   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  Oldest daughter in [place], second daughter  and living in [facility], son going to [employer].  No EtOH, tobacco.     Providers:  DANNI-Dr. Frankel  Psychiatry-Dr. Jeremias Rodriguez  Dermatology-Dr. Vieyra, not recently   Objective   BP 95/61   Pulse 74   Temp 36.3 °C (97.4 °F)   Ht 1.651 m (5' 5\")   Wt 78.9 kg (174 lb)   SpO2 96%   BMI 28.96 kg/m²    Physical Exam  Gen: NAD, pleasant, A&;Ox3  HEENT: PERRL, EOMI, MMM, OP clear  Neck: supple, no thyromegaly, " "no JVD, normal carotid upstroke  Pulm: lungs CTAB, good air movement  CV: RRR, no m/r/g, 2+ DP pulses  Abd: NABS, soft, NT, ND no HSM  Ext: no peripheral edema  Neuro: CN II-XII intact, no focal sensory or motor deficits, normal reflexes    Lab Results   Component Value Date    HGBA1C 7.1 (H) 02/27/2024      No results found for: \"ALBUR\", \"[id number]\"   Lab Results   Component Value Date    CHOL 127 11/03/2023     Lab Results   Component Value Date    HDL 28.1 11/03/2023     Lab Results   Component Value Date    LDLCALC 57 11/03/2023     Lab Results   Component Value Date    TRIG 208 (H) 11/03/2023     No components found for: \"CHOLHDL\"  Lab Results   Component Value Date    CREATININE 0.71 02/10/2024          Assessment/Plan     NIDDM: Hemoglobin A1c 8.5%, 11/3/2023 -->7.1% today  - continue Metformin 500mg BID  -Extensive discussion as above and referral to RD  -CGM sample was provided and helped apply and initiate, LibreView invitation sent and will be monitored  - start statin if remains in diabetic range  - follow-up in 3 months    Low testosterone:  discussed relationship to weight and metabolism as well; since I would anyway confirm with a repeat test and recommend working on lifestyle modifications, we will have him recheck a morning/fasting level prior to next visit    **COPIED FORWARD FOR REFERENCE**  CV:  -EKG is reassuring, low LDL measured 7/28/2022, no concerning family or personal history  -continue working on diet and exercise  -Discuss statin treatment given diabetes    Psych: anxiety and depression  - seeing Dr Jeremias Rodriguez, psychiatry  - seeing Sukhdev Owen, therapy  - planning on CBT-I therapy at [facility]  - med rec updated     MALA/History of hypertension: Off meds, no hypertension since being on CPAP; using regularly, helps with sleep quality and energy, continue     BPH: cont Flomax, return to Urology prn; low T on recent labs, recheck; no symptoms of decreased libido or ED     Health " maintenance  - recheck metabolics, has IFG  -Last colonoscopy: 12/3/2020, normal; repeat 5 years (hx of polyps)  - PSA: 3.75ng/mL JUL 2022  -Smoking history: Never  -Counseled regarding diet and exercise, patient has relatively good at home, discussed portion control, increase exercise  -Immunizations: current  -Followup in one year or as needed  Problem List Items Addressed This Visit       BPH associated with nocturia    Relevant Medications    tamsulosin (Flomax) 0.4 mg 24 hr capsule     Other Visit Diagnoses       Type 2 diabetes mellitus without complication, without long-term current use of insulin (CMS/[facility])    -  Primary    Relevant Orders    POCT Glycosylated Hemoglobin (HGB A1C) docked device    Low testosterone in male        Relevant Orders    Testosterone                   Rocky Orozco MD

## 2024-05-02 ENCOUNTER — HOSPITAL ENCOUNTER (OUTPATIENT)
Dept: RADIOLOGY | Facility: CLINIC | Age: 59
Discharge: HOME | End: 2024-05-02
Payer: COMMERCIAL

## 2024-05-02 ENCOUNTER — OFFICE VISIT (OUTPATIENT)
Dept: ORTHOPEDIC SURGERY | Facility: CLINIC | Age: 59
End: 2024-05-02
Payer: COMMERCIAL

## 2024-05-02 ENCOUNTER — APPOINTMENT (OUTPATIENT)
Dept: RADIOLOGY | Facility: CLINIC | Age: 59
End: 2024-05-02
Payer: COMMERCIAL

## 2024-05-02 DIAGNOSIS — M79.673 PAIN OF FOOT, UNSPECIFIED LATERALITY: ICD-10-CM

## 2024-05-02 DIAGNOSIS — M72.2 PLANTAR FASCIITIS OF LEFT FOOT: Primary | ICD-10-CM

## 2024-05-02 PROCEDURE — 99213 OFFICE O/P EST LOW 20 MIN: CPT | Performed by: STUDENT IN AN ORGANIZED HEALTH CARE EDUCATION/TRAINING PROGRAM

## 2024-05-02 PROCEDURE — 73630 X-RAY EXAM OF FOOT: CPT | Mod: LEFT SIDE | Performed by: RADIOLOGY

## 2024-05-02 PROCEDURE — 3008F BODY MASS INDEX DOCD: CPT | Performed by: STUDENT IN AN ORGANIZED HEALTH CARE EDUCATION/TRAINING PROGRAM

## 2024-05-02 PROCEDURE — 73630 X-RAY EXAM OF FOOT: CPT | Mod: LT

## 2024-05-02 NOTE — PROGRESS NOTES
REFERRAL SOURCE: No ref. provider found     CHIEF COMPLAINT: left foot pain    HISTORY OF PRESENT ILLNESS  Yonis Murrell is a very pleasant 58 y.o. male with history of T2DM who is here for evaluation of left foot pain.     5/2/24:  Patient first started experiencing left foot pain roughly eight months ago last fall.  He describes it as an intermittent sharp/stabbing pain on the bottom of his heel that is worse with walking and activity.  He notes that it feels tighter in the morning.  At baseline his pain is a 3/10 and has not been inhibiting his daily life to a significant degree, but it has been getting slowly worse over the last few weeks so he finally decided to come in and be evaluated for it.  He denies taking any Tylenol, NSAIDs, or topical therapies.  He wears shoe inserts at baseline, and had a bout of left foot plantar fasciitis many years ago that was successfully treated with PT and an injection.  He states that this pain feels different from what he remembers feeling with his prior episode of plantar fasciitis.    MEDS    Current Outpatient Medications:     amphetamine-dextroamphetamine XR (Adderall XR) 20 mg 24 hr capsule, , Disp: , Rfl:     blood-glucose sensor (FreeStyle Madeleine 3 Sensor) device, Apply every 2 weeks and monitor blood sugar as instructed, Disp: 4 each, Rfl: 3    DULoxetine (Cymbalta) 60 mg DR capsule, Take 1 capsule (60 mg) by mouth once daily in the morning., Disp: , Rfl:     lithium 150 mg capsule, Take 1 capsule (150 mg) by mouth 2 times a day with meals., Disp: , Rfl:     metFORMIN  mg 24 hr tablet, TAKE 1 TABLET (500 MG) BY MOUTH 2 TIMES A DAY WITH MEALS. DO NOT CRUSH, CHEW, OR SPLIT., Disp: 180 tablet, Rfl: 1    tamsulosin (Flomax) 0.4 mg 24 hr capsule, Take 1 capsule (0.4 mg) by mouth once daily., Disp: 90 capsule, Rfl: 3    zolpidem (Ambien) 5 mg tablet, Take 1 tablet (5 mg) by mouth as needed at bedtime., Disp: , Rfl:     ALLERGIES  No Known Allergies    PAST MEDICAL  HISTORY  Past Medical History:   Diagnosis Date    Allergic rhinitis due to pollen     Hay fever    Bilateral plantar fasciitis 02/27/2024    Lactose intolerance, unspecified     Lactose intolerance    Pain in leg, unspecified 04/08/2015    Leg pain    Pain in unspecified ankle and joints of unspecified foot 04/08/2015    Ankle pain    Personal history of other diseases of the circulatory system 04/11/2016    History of essential hypertension    Personal history of other diseases of the respiratory system 04/08/2015    History of acute bronchitis       PAST SURGICAL HISTORY  Past Surgical History:   Procedure Laterality Date    ADENOIDECTOMY  05/12/2015    Adenoidectomy    CHOLECYSTECTOMY  05/12/2015    Cholecystectomy    ESOPHAGOGASTRODUODENOSCOPY  05/12/2015    Diagnostic Esophagogastroduodenoscopy    OTHER SURGICAL HISTORY  05/12/2015    Biopsy Tongue       SOCIAL HISTORY   Social History     Socioeconomic History    Marital status:      Spouse name: Not on file    Number of children: Not on file    Years of education: Not on file    Highest education level: Not on file   Occupational History    Not on file   Tobacco Use    Smoking status: Never    Smokeless tobacco: Never   Substance and Sexual Activity    Alcohol use: Not on file    Drug use: Not on file    Sexual activity: Not on file   Other Topics Concern    Not on file   Social History Narrative    Not on file     Social Determinants of Health     Financial Resource Strain: Not on file   Food Insecurity: Not on file   Transportation Needs: Not on file   Physical Activity: Not on file   Stress: Not on file   Social Connections: Not on file   Intimate Partner Violence: Not on file   Housing Stability: Not on file       FAMILY HISTORY  No family history on file.    REVIEW OF SYSTEMS  Except for those mentioned in the history of present illness, and below, a complete review of systems is negative.     Review of Systems     VITALS  There were no vitals  filed for this visit.    PHYSICAL EXAMINATION   GENERAL:  Awake, alert, and oriented, no apparent distress, pleasant, and cooperative  PSYC: Mood is euthymic, affect is congruent  EAR, NOSE, THROAT:  Normocephalic, atraumatic, moist membranes, anicteric sclera  LUNG: Nonlabored breathing  HEART: No clubbing or cyanosis  SKIN: No increased erythema, warmth, rashes, or concerning skin lesions  NEURO: Sensation is intact in the bilateral lower extremities. Strength is grossly 5 out of 5 throughout the bilateral lower extremities, unless noted below.  GAIT: Non-antalgic  MUSCULOSKELETAL: Examination of the left foot: Ankle range of motion is full and pain-free. No obvious swelling or ecchymosis. Strength of the ankle and foot is normal. Tenderness to palpation in the region of the origin of the plantar fascia. No tenderness to palpation over the Achilles tendon, calcaneus, navicular, base of 5th, metatarsal shafts, intermetarsal spaces, metatarsal heads, 1st MTP joint. Negative calcaneal squeeze. Metatarsal squeeze is negative.     IMAGING STUDIES:   Radiographs of the left foot dated 5/2/2024 were personally reviewed and interpreted by me, Dr. Albina Olsen, and the findings shared with the patient.  Evidence of mass navicular and os peroneum.  Ossicle between the first and second metatarsal bases.     IMPRESSION  #1 Left plantar fasciitis    PLAN  The following was discussed with the patient:     Yonis Murrell is a very pleasant 58 y.o. male with history of T2DM who is here for evaluation of left foot pain due to left plantar fascitis.   -We discussed the diagnosis in detail.  -Achilles/gastroc/plantar fascia stretching and foot core strengthening with physical therapy. This is the mainstay of treatment.  Discussed calf stretching 30 seconds x3 in the morning and at night, as well as foot strengthening by scrunching bath towel with toes multiple times per day until he gets in with physical therapy.  -Roll frozen  water bottle under foot for ice massage several times a day.  -May take NSAIDs (e.g. Aleve, ibuprofen, naproxen) as instructed on the bottle for 7 days if you do not have any contraindications to doing so, such as stomach issues, reflux, kidney problems, or heart problems.   -Wear supportive shoes.  -Wear [place] sock every night as tolerated.  -Can continue activities that do not worsen pain and keep pain <3/10.   -If not improved, we briefly discussed potential additional treatment options including shockwave therapy or ultrasound-guided injection with corticosteroid or PRP.   -Follow-up in 8-10 weeks, or sooner if needed.      The patient was counseled to remain active, but avoid activities that worsen symptoms. The patient was in agreement with this plan. All questions were answered to the best of my ability.    PATIENT EDUCATION:  Education was discussed at today's appointment. A learning needs assessment was performed.    Primary learner: Yonis Murrell  Barriers to learning: None  Preferred language: English  Learning preferences include: Seeing and doing.  Discussed: Diagnosis and treatment plan.  Demonstrated: Understanding of material discussed.  Patient education materials given: None.  Learner response: Learner demonstrated understanding.    This note was dictated using Dragon speech recognition software and was not corrected for spelling or grammatical errors.    Patient seen and examined with PM&R resident, Dr. Villalba. History, physical examination, pertinent imaging findings and the plan of care were discussed and I performed the key portions of the history, physical examination, and discussion of the plan of care. I have edited his note and agree with the findings.      Albina Olsen MD    [facility] [place]   [facility]

## 2024-05-13 ENCOUNTER — NUTRITION (OUTPATIENT)
Dept: GASTROENTEROLOGY | Facility: HOSPITAL | Age: 59
End: 2024-05-13
Payer: COMMERCIAL

## 2024-05-13 DIAGNOSIS — E11.9 TYPE 2 DIABETES MELLITUS WITHOUT COMPLICATION, WITHOUT LONG-TERM CURRENT USE OF INSULIN (MULTI): Primary | ICD-10-CM

## 2024-05-13 PROCEDURE — 97803 MED NUTRITION INDIV SUBSEQ: CPT

## 2024-05-13 NOTE — PROGRESS NOTES
"Nutrition: Follow-up     Reason for Nutrition Visit: Patient is a 58 y.o. male referred for T2DM. Referred on 12/22/23 by Dr. Rocky Orozco.     Nutrition Assessment    Food and Nutrient History: Pt presents for visit #2. Last nutrition visit 2/12/24.     Dietary Recall:  Meal 1: 2 eggs, 1 slice Calvin bread     Fluid Intake: herbal tea, water, seltzer  Alcohol: 0-1 drink per week  Dietary Considerations: keeps Kosher  Appetite: Normal  Food Allergy: none  Food Intolerance: some degree of lactose intolerance  GI Symptoms: none    Dentition: own  Cooking: Patient, Spouse/Significant Other  Grocery Shopping: Patient, Spouse/Significant Other  Dietary Supplements: none  Food Insecurity: Denies    Physical Activity: Joined gym and trying to do 30 mins of brisk walking     Labs:  Lipid panel: High VLDL = 42, high TG = 208 (11/3/23)  No recent vitamin D on file    A1c  7.1% 02/27/24  8.5% 11/03/23  6.0% 07/28/22    Diabetes:  Diagnosed end of 2023  Prior Nutrition Education: No   SMBG: Freestyle Madeleine 3  Hypoglycemia: none    Madeleine 3 (2/14/24-5/13/24)   Average Glucose: 164 mg/dL  GMI:  7.2%   Glucose Variability: 19.1%     2% of time was spent > 250 mg/dL  24% of time was spent between 181-250 mg/dL  72% of time was spent between  mg/dL  0% of time was spent between 54-69 mg/dL   0% of time was spent < 54 mg/dL    Comments: Most prominent instances of hyperglycemia later in the day around dinner, past 90 days of CGM data reviewed    Current DM Medications/Insulin Regimen:  - Metformin  mg BID    Nutrition Focused Physical Exam:  Performed/Deferred: Deferred as pt visually appears well-nourished with no signs of malnutrition      Past Medical History:  Patient Active Problem List   Diagnosis    BPH associated with nocturia    Adjustment disorder with anxiety    Obstructive sleep apnea    Bronchitis    Acute cough        Anthropometrics:  Ht Readings from Last 1 Encounters:   02/12/24 1.651 m (5' 5\")     BMI " Readings from Last 1 Encounters:   02/12/24 28.96 kg/m²     Wt Readings from Last 10 Encounters:   02/27/24 78.9 kg (174 lb)   01/26/24 78.9 kg (174 lb)   12/22/23 80.7 kg (178 lb)   10/25/23 82.6 kg (182 lb)   10/13/22 80.3 kg (177 lb)   09/14/22 80.7 kg (178 lb)   07/28/22 79.8 kg (176 lb)   07/22/21 80.3 kg (177 lb)   05/13/21 79.8 kg (175 lb 14.4 oz)   01/28/21 79.4 kg (175 lb)       Estimated Energy Needs:    Total Energy Estimated Needs (kCal): 1800 kCal   Method for Estimating Needs: 1546 x 1.3 - 250 (for weight loss)   Total Protein Estimated Needs (g): 80 g   Total Protein Estimated Needs (g/kg): 1 g/kg    Nutrition Diagnosis     Patient has Malnutrition Diagnosis: No        Patient has Nutrition Diagnosis: Yes Diagnosis Status (1): ongoing  Nutrition Diagnosis 1: Altered nutrition related to laboratory values Related to (1): DM As Evidenced by (1): A1c = 8.5% (11/3/23)   Update: A1c = 7.1% (2/27/24)       Nutrition Interventions/Recommendations   Meals & Snacks: Carbohydrate-modified diet, Energy-modified diet, Fiber-modified diet, Modify Composition of Meals/Snacks, General Healthful Diet, Protein-modified diet    Nutrition Education:  1) Focus on walking 10-15 mins before/after dinner to help with blood sugar levels.   2) Reduce carbohydrates at dinner.     Nutrition Monitoring and Evaluation   Consistent meal/snack pattern - did not assess  Intentional weight loss of 0.5-2 lb per week, trending toward a clinically significant weight loss of 5% of current body weight - did not assess  Reduced TG < 150  - no update   A1c: less than 7% - not met, ongoing   Time in range > 70% - met, ongoing     Readiness to Change : Excellent  Level of Understanding : Excellent  Anticipated Compliant : Excellent     Follow-up: 3 months

## 2024-05-14 ENCOUNTER — LAB (OUTPATIENT)
Dept: LAB | Facility: LAB | Age: 59
End: 2024-05-14
Payer: COMMERCIAL

## 2024-05-14 DIAGNOSIS — R79.89 LOW TESTOSTERONE IN MALE: ICD-10-CM

## 2024-05-14 DIAGNOSIS — F63.81 INTERMITTENT EXPLOSIVE DISORDER: Primary | ICD-10-CM

## 2024-05-14 LAB
ANION GAP SERPL CALC-SCNC: 13 MMOL/L (ref 10–20)
BUN SERPL-MCNC: 16 MG/DL (ref 6–23)
CALCIUM SERPL-MCNC: 9 MG/DL (ref 8.6–10.3)
CHLORIDE SERPL-SCNC: 102 MMOL/L (ref 98–107)
CO2 SERPL-SCNC: 27 MMOL/L (ref 21–32)
CREAT SERPL-MCNC: 0.78 MG/DL (ref 0.5–1.3)
EGFRCR SERPLBLD CKD-EPI 2021: >90 ML/MIN/1.73M*2
GLUCOSE SERPL-MCNC: 162 MG/DL (ref 74–99)
LITHIUM SERPL-SCNC: 0.1 MMOL/L (ref 0.6–1.2)
POTASSIUM SERPL-SCNC: 3.7 MMOL/L (ref 3.5–5.3)
SODIUM SERPL-SCNC: 138 MMOL/L (ref 136–145)
TESTOST SERPL-MCNC: 273 NG/DL (ref 240–1000)

## 2024-05-14 PROCEDURE — 36415 COLL VENOUS BLD VENIPUNCTURE: CPT

## 2024-05-14 PROCEDURE — 80178 ASSAY OF LITHIUM: CPT

## 2024-05-14 PROCEDURE — 80048 BASIC METABOLIC PNL TOTAL CA: CPT

## 2024-05-14 PROCEDURE — 84403 ASSAY OF TOTAL TESTOSTERONE: CPT

## 2024-05-29 ENCOUNTER — OFFICE VISIT (OUTPATIENT)
Dept: PRIMARY CARE | Facility: CLINIC | Age: 59
End: 2024-05-29
Payer: COMMERCIAL

## 2024-05-29 VITALS
WEIGHT: 169 LBS | DIASTOLIC BLOOD PRESSURE: 70 MMHG | BODY MASS INDEX: 28.12 KG/M2 | OXYGEN SATURATION: 97 % | SYSTOLIC BLOOD PRESSURE: 115 MMHG | TEMPERATURE: 97.3 F | HEART RATE: 72 BPM

## 2024-05-29 DIAGNOSIS — R79.89 LOW TESTOSTERONE IN MALE: ICD-10-CM

## 2024-05-29 DIAGNOSIS — E11.9 TYPE 2 DIABETES MELLITUS WITHOUT COMPLICATION, WITHOUT LONG-TERM CURRENT USE OF INSULIN (MULTI): Primary | ICD-10-CM

## 2024-05-29 LAB — HBA1C MFR BLD: 6.4 % (ref 4.2–6.5)

## 2024-05-29 PROCEDURE — 3044F HG A1C LEVEL LT 7.0%: CPT | Performed by: INTERNAL MEDICINE

## 2024-05-29 PROCEDURE — 3074F SYST BP LT 130 MM HG: CPT | Performed by: INTERNAL MEDICINE

## 2024-05-29 PROCEDURE — 3008F BODY MASS INDEX DOCD: CPT | Performed by: INTERNAL MEDICINE

## 2024-05-29 PROCEDURE — 3078F DIAST BP <80 MM HG: CPT | Performed by: INTERNAL MEDICINE

## 2024-05-29 PROCEDURE — 99214 OFFICE O/P EST MOD 30 MIN: CPT | Performed by: INTERNAL MEDICINE

## 2024-05-29 PROCEDURE — 83036 HEMOGLOBIN GLYCOSYLATED A1C: CPT | Mod: CLIA WAIVED TEST | Performed by: INTERNAL MEDICINE

## 2024-05-29 RX ORDER — BLOOD-GLUCOSE SENSOR
EACH MISCELLANEOUS
Qty: 6 EACH | Refills: 3 | Status: SHIPPED | OUTPATIENT
Start: 2024-05-29

## 2024-05-29 NOTE — PROGRESS NOTES
Subjective   Yonis Murrell is a 58 y.o. male who presents for Follow-up.  HPI    Past medical history includes diabetes, anxiety/adjustment disorder and MALA.    Interim:  - sports medicine  - RD follow-up; appears to be very carb-sensitive    Previously had prediabetes, on lab work in early November 2023 his A1c showed overt diabetes with a hemoglobin A1c of 8.5% improved with POC A1c at 6.4%.  Has made strides with diet and exercise; has room to continue improving.  Started on metformin 1/23/2024.  No adverse effects.  Met with Char, 5/17/2024, tracking carbs; reviewed CGM today and uploaded report.  Slightly higher average than A1c but definite improvement.    He has no vision changes or blurriness, no numbness or tingling in the extremities, no polyuria or polydipsia.    Followed by psychiatry, helping a lot with mood, irritability.  Normal renal function.    He also had borderline low testosterone and was concerned about relationship to poor energy and mood, discussed this with his psychiatrist as well.  Libido is decreased but adequate, muscle mass seems to be decreased from previous.    **COPIED FORWARD FOR REFERENCE**    Continues on tamsulosin, typically up once per night; STABLE, and satisfactory.      Obstructive sleep apnea, Dr. Murtaza Ladd, now on CPAP, has significant improvement with his mood, his energy during the day, wakes up feeling more rested. Been using CPAP regularly since September 2015.      , 3 kids doing well.   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  Oldest daughter in [place], second daughter  and living in [facility], son going to [employer].  No EtOH, tobacco.     Providers:  GI-Dr. Frankel  Psychiatry-Dr. Jeremias Rodriguez  Dermatology-Dr. Vieyra, not recently   Objective   /70   Pulse 72   Temp 36.3 °C (97.3 °F)   Wt 76.7 kg (169 lb)   SpO2 97%   BMI 28.12 kg/m²    Physical Exam  Gen: NAD, pleasant, A&;Ox3  HEENT: PERRL, EOMI, MMM, OP clear  Neck: supple, no thyromegaly, no JVD,  "normal carotid upstroke  Pulm: lungs CTAB, good air movement  CV: RRR, no m/r/g, 2+ DP pulses  Abd: NABS, soft, NT, ND no HSM  Ext: no peripheral edema  Neuro: CN II-XII intact, no focal sensory or motor deficits, normal reflexes    Lab Results   Component Value Date    HGBA1C 7.1 (H) 02/27/2024      No results found for: \"ALBUR\", \"[id number]\"   Lab Results   Component Value Date    CHOL 127 11/03/2023     Lab Results   Component Value Date    HDL 28.1 11/03/2023     Lab Results   Component Value Date    LDLCALC 57 11/03/2023     Lab Results   Component Value Date    TRIG 208 (H) 11/03/2023     No components found for: \"CHOLHDL\"  Lab Results   Component Value Date    CREATININE 0.78 05/14/2024          Assessment/Plan     NIDDM: Hemoglobin A1c 8.5%, 11/3/2023 -->6.4% today  - continue Metformin 500mg BID  -Extensive discussion as above and with RD  -continue monitoring with Libre3  - Lipids never an issue but probably would start low dose given DM  - follow-up in 3 months    Low testosterone:  discussed relationship to weight and metabolism as well; confirmed on repeat, refer to urology    **COPIED FORWARD FOR REFERENCE**  CV:  -EKG is reassuring, low LDL measured 7/28/2022, no concerning family or personal history  -continue working on diet and exercise  -Discuss statin treatment given diabetes    Psych: anxiety and depression  - seeing Dr Jeremias Rodriguez, psychiatry  - seeing Sukhdev Owen, therapy  - planning on [facility] at [facility]  - med rec updated     MALA/History of hypertension: Off meds, no hypertension since being on CPAP; using regularly, helps with sleep quality and energy, continue     BPH: cont Flomax, return to Urology prn; low T on recent labs, recheck; no symptoms of decreased libido or ED     Health maintenance  - recheck metabolics, has IFG  -Last colonoscopy: 12/3/2020, normal; repeat 5 years (hx of polyps)  - PSA: 3.75ng/mL JUL 2022  -Smoking history: Never  -Counseled regarding diet and exercise, " patient has relatively good at home, discussed portion control, increase exercise  -Immunizations: current  -Followup in one year or as needed  Problem List Items Addressed This Visit    None  Visit Diagnoses       Type 2 diabetes mellitus without complication, without long-term current use of insulin ([facility])        Relevant Orders    POCT Glycosylated Hemoglobin (HGB A1C) docked device                   Rocky Orozco MD

## 2024-06-27 ENCOUNTER — APPOINTMENT (OUTPATIENT)
Dept: ORTHOPEDIC SURGERY | Facility: CLINIC | Age: 59
End: 2024-06-27
Payer: COMMERCIAL

## 2024-07-22 ENCOUNTER — LAB (OUTPATIENT)
Dept: LAB | Facility: LAB | Age: 59
End: 2024-07-22
Payer: COMMERCIAL

## 2024-07-22 ENCOUNTER — OFFICE VISIT (OUTPATIENT)
Dept: UROLOGY | Facility: HOSPITAL | Age: 59
End: 2024-07-22
Payer: COMMERCIAL

## 2024-07-22 DIAGNOSIS — N40.0 BENIGN PROSTATIC HYPERPLASIA, UNSPECIFIED WHETHER LOWER URINARY TRACT SYMPTOMS PRESENT: Primary | ICD-10-CM

## 2024-07-22 DIAGNOSIS — R79.89 LOW TESTOSTERONE: ICD-10-CM

## 2024-07-22 DIAGNOSIS — R79.89 LOW TESTOSTERONE IN MALE: ICD-10-CM

## 2024-07-22 DIAGNOSIS — E29.1 HYPOGONADISM MALE: ICD-10-CM

## 2024-07-22 DIAGNOSIS — Z12.5 PROSTATE CANCER SCREENING: ICD-10-CM

## 2024-07-22 DIAGNOSIS — E29.1 HYPOGONADISM IN MALE: ICD-10-CM

## 2024-07-22 LAB
HCT VFR BLD AUTO: 36.5 % (ref 41–52)
LH SERPL-ACNC: 3.5 IU/L
PROLACTIN SERPL-MCNC: 3.8 UG/L (ref 2–18)
PSA SERPL-MCNC: 3.65 NG/ML
TESTOST SERPL-MCNC: 243 NG/DL (ref 240–1000)

## 2024-07-22 PROCEDURE — 84146 ASSAY OF PROLACTIN: CPT

## 2024-07-22 PROCEDURE — G2211 COMPLEX E/M VISIT ADD ON: HCPCS | Performed by: UROLOGY

## 2024-07-22 PROCEDURE — 36415 COLL VENOUS BLD VENIPUNCTURE: CPT

## 2024-07-22 PROCEDURE — 83002 ASSAY OF GONADOTROPIN (LH): CPT

## 2024-07-22 PROCEDURE — 84153 ASSAY OF PSA TOTAL: CPT

## 2024-07-22 PROCEDURE — 99215 OFFICE O/P EST HI 40 MIN: CPT | Performed by: UROLOGY

## 2024-07-22 PROCEDURE — 99205 OFFICE O/P NEW HI 60 MIN: CPT | Performed by: UROLOGY

## 2024-07-22 PROCEDURE — 85014 HEMATOCRIT: CPT

## 2024-07-22 PROCEDURE — 84403 ASSAY OF TOTAL TESTOSTERONE: CPT

## 2024-07-22 RX ORDER — TESTOSTERONE CYPIONATE 200 MG/ML
80 INJECTION, SOLUTION INTRAMUSCULAR
Qty: 5 ML | Refills: 3 | Status: SHIPPED | OUTPATIENT
Start: 2024-07-22

## 2024-07-22 RX ORDER — SYRINGE W-NEEDLE,DISPOSAB,3 ML 23GX1"
SYRINGE, EMPTY DISPOSABLE MISCELLANEOUS
Qty: 16 EACH | Refills: 3 | Status: SHIPPED | OUTPATIENT
Start: 2024-07-22

## 2024-07-22 RX ORDER — TESTOSTERONE CYPIONATE 200 MG/ML
80 INJECTION, SOLUTION INTRAMUSCULAR
Qty: 5 ML | Refills: 3 | Status: SHIPPED | OUTPATIENT
Start: 2024-07-22 | End: 2024-07-22

## 2024-07-22 NOTE — PROGRESS NOTES
Subjective   HPI:  59 y.o. yo male patient with PMH of MALA, BPH, depression and T2DM who complains of  symptoms consistent with hypogonadism. He is specifically worried about whether low testosterone is affecting his energy level and depression.    Previous use of testosterone: No    These include:  Decreased libido: No  Decreased energy: Yes (decreased over last few years)  Decreased muscle mass: Yes (and decreased strength)  Erectile Dysfunction: Yes, strength is 7/10, has occasional morning erections, it has been more difficult to achieve the same strength as in the past    Want to preserve fertility: No  Personal history of gynecomastia and/or concerns about the condition: No  Family / Personal History of Prostate Cancer: No  MI or Stroke: No    Lab Results   Component Value Date    TESTOSTERONE 273 05/14/2024    TESTOSTERONE 246 11/03/2023     Component      Latest Ref Rng 11/3/2023   GLUCOSE      74 - 99 mg/dL 234 (H)    SODIUM      136 - 145 mmol/L 138    POTASSIUM      3.5 - 5.3 mmol/L 4.0    CHLORIDE      98 - 107 mmol/L 102    Bicarbonate      21 - 32 mmol/L 29    Anion Gap      10 - 20 mmol/L 11    Blood Urea Nitrogen      6 - 23 mg/dL 13    Creatinine      0.50 - 1.30 mg/dL 0.68    EGFR      >60 mL/min/1.73m*2 >90    Calcium      8.6 - 10.3 mg/dL 9.5    Albumin      3.4 - 5.0 g/dL 4.2    Alkaline Phosphatase      33 - 120 U/L 96    Total Protein      6.4 - 8.2 g/dL 6.7    AST      9 - 39 U/L 29    Bilirubin Total      0.0 - 1.2 mg/dL 1.1    ALT      10 - 52 U/L 41    LEUKOCYTES (10*3/UL) IN BLOOD BY AUTOMATED COUNT, [place]alese      4.4 - 11.3 x10*3/uL 6.5    nRBC      0.0 - 0.0 /100 WBCs 0.0    ERYTHROCYTES (10*6/UL) IN BLOOD BY AUTOMATED COUNT, [place]alese      4.50 - 5.90 x10*6/uL 5.13    HEMOGLOBIN      13.5 - 17.5 g/dL 15.0    HEMATOCRIT      41.0 - 52.0 % 42.3    MCV      80 - 100 fL 83    MCH      26.0 - 34.0 pg 29.2    MCHC      32.0 - 36.0 g/dL 35.5    RED CELL DISTRIBUTION WIDTH      11.5 - 14.5 %  12.9    PLATELETS (10*3/UL) IN BLOOD AUTOMATED COUNT, [place]      150 - 450 x10*3/uL 196    PSA      <=4.00 ng/mL 3.17    Testosterone      240 - 1,000 ng/dL 246      Component      Latest Ref Rng 2/10/2024   GLUCOSE      74 - 99 mg/dL 134 (H)    SODIUM      136 - 145 mmol/L 138    POTASSIUM      3.5 - 5.3 mmol/L 3.9    CHLORIDE      98 - 107 mmol/L 104    Bicarbonate      21 - 32 mmol/L 26    Anion Gap      10 - 20 mmol/L 12    Blood Urea Nitrogen      6 - 23 mg/dL 15    Creatinine      0.50 - 1.30 mg/dL 0.71    EGFR      >60 mL/min/1.73m*2 >90    Calcium      8.6 - 10.3 mg/dL 9.2    Albumin      3.4 - 5.0 g/dL    Alkaline Phosphatase      33 - 120 U/L    Total Protein      6.4 - 8.2 g/dL    AST      9 - 39 U/L    Bilirubin Total      0.0 - 1.2 mg/dL    ALT      10 - 52 U/L    LEUKOCYTES (10*3/UL) IN BLOOD BY AUTOMATED COUNT, [place]      4.4 - 11.3 x10*3/uL    nRBC      0.0 - 0.0 /100 WBCs    ERYTHROCYTES (10*6/UL) IN BLOOD BY AUTOMATED COUNT, [place]      4.50 - 5.90 x10*6/uL    HEMOGLOBIN      13.5 - 17.5 g/dL    HEMATOCRIT      41.0 - 52.0 %    MCV      80 - 100 fL    MCH      26.0 - 34.0 pg    MCHC      32.0 - 36.0 g/dL    RED CELL DISTRIBUTION WIDTH      11.5 - 14.5 %    PLATELETS (10*3/UL) IN BLOOD AUTOMATED COUNT, [place]      150 - 450 x10*3/uL    PSA      <=4.00 ng/mL    Testosterone      240 - 1,000 ng/dL      Component      Latest Ref Rng 5/14/2024   GLUCOSE      74 - 99 mg/dL    SODIUM      136 - 145 mmol/L    POTASSIUM      3.5 - 5.3 mmol/L    CHLORIDE      98 - 107 mmol/L    Bicarbonate      21 - 32 mmol/L    Anion Gap      10 - 20 mmol/L    Blood Urea Nitrogen      6 - 23 mg/dL    Creatinine      0.50 - 1.30 mg/dL    EGFR      >60 mL/min/1.73m*2    Calcium      8.6 - 10.3 mg/dL    Albumin      3.4 - 5.0 g/dL    Alkaline Phosphatase      33 - 120 U/L    Total Protein      6.4 - 8.2 g/dL    AST      9 - 39 U/L    Bilirubin Total      0.0 - 1.2 mg/dL    ALT      10 - 52 U/L    LEUKOCYTES (10*3/UL)  "IN BLOOD BY AUTOMATED COUNT, [place]      4.4 - 11.3 x10*3/uL    nRBC      0.0 - 0.0 /100 WBCs    ERYTHROCYTES (10*6/UL) IN BLOOD BY AUTOMATED COUNT, [place]      4.50 - 5.90 x10*6/uL    HEMOGLOBIN      13.5 - 17.5 g/dL    HEMATOCRIT      41.0 - 52.0 %    MCV      80 - 100 fL    MCH      26.0 - 34.0 pg    MCHC      32.0 - 36.0 g/dL    RED CELL DISTRIBUTION WIDTH      11.5 - 14.5 %    PLATELETS (10*3/UL) IN BLOOD AUTOMATED COUNT, [place]      150 - 450 x10*3/uL    PSA      <=4.00 ng/mL    Testosterone      240 - 1,000 ng/dL 273       Legend:  (H) High      No components found for: \"PSA;3\"    PMH:  Past Medical History:   Diagnosis Date    Allergic rhinitis due to pollen     Hay fever    Bilateral plantar fasciitis 02/27/2024    Lactose intolerance, unspecified     Lactose intolerance    Pain in leg, unspecified 04/08/2015    Leg pain    Pain in unspecified ankle and joints of unspecified foot 04/08/2015    Ankle pain    Personal history of other diseases of the circulatory system 04/11/2016    History of essential hypertension    Personal history of other diseases of the respiratory system 04/08/2015    History of acute bronchitis        PSH:  Past Surgical History:   Procedure Laterality Date    ADENOIDECTOMY  05/12/2015    Adenoidectomy    CHOLECYSTECTOMY  05/12/2015    Cholecystectomy    ESOPHAGOGASTRODUODENOSCOPY  05/12/2015    Diagnostic Esophagogastroduodenoscopy    OTHER SURGICAL HISTORY  05/12/2015    Biopsy Tongue        Medications:    Current Outpatient Medications:     amphetamine-dextroamphetamine XR (Adderall XR) 20 mg 24 hr capsule, , Disp: , Rfl:     blood-glucose sensor (FreeStyle Madeleine 3 Sensor) device, Apply every 2 weeks and monitor blood sugar as instructed, Disp: 6 each, Rfl: 3    DULoxetine (Cymbalta) 60 mg DR capsule, Take 1 capsule (60 mg) by mouth once daily in the morning., Disp: , Rfl:     lithium 150 mg capsule, Take 1 capsule (150 mg) by mouth 2 times a day with meals., Disp: , Rfl: "     metFORMIN  mg 24 hr tablet, TAKE 1 TABLET (500 MG) BY MOUTH 2 TIMES A DAY WITH MEALS. DO NOT CRUSH, CHEW, OR SPLIT., Disp: 180 tablet, Rfl: 1    tamsulosin (Flomax) 0.4 mg 24 hr capsule, Take 1 capsule (0.4 mg) by mouth once daily., Disp: 90 capsule, Rfl: 3    zolpidem (Ambien) 5 mg tablet, Take 1 tablet (5 mg) by mouth as needed at bedtime., Disp: , Rfl:     Social Hx:  Social History     Socioeconomic History    Marital status:    Tobacco Use    Smoking status: Never    Smokeless tobacco: Never      [unfilled]    x:  family history is not on file.     Allergy:  No Known Allergies     Exam  NAD  Nonlabored breathing  Abd nondistended    Assessment/Plan:  I reviewed the common causes of hypogonadism with the patient. We will obtain a fasting, morning hypogonadal panel to determine if his hormonal axis is abnormal. We will evaluate his lab results and if needed, at that point we will begin treatment.     We discussed different modalities to treat hypogonadism, including hcg, anastrozole, clomiphene, testosterone gels/creams, nasal testosterone, testosterone pellets, and testosterone injections. I also reviewed with him common risks following testosterone replacement, including cholesterol imbalance, polycythemia, cancer progression and infertility.  He understands these risks and wishes to proceed. The patient is to adhere to a strict followup either every 3-4 months or biannually.  We reviewed with him with our office's consent form.     We also reviewed his fertility status and whether sperm production is a concern.  If so, HCG will be added.  We also discussed the possibility of gynecomastia secondary to testosterone therapy and methods of prevention and/or treatment if this occurs.    Hypogonadism  fasting hypogonadal panel   Order 0.4 ml testosterone injections  Injection teaching done today    Prostate ca screening  Psa stable 3.17 (3.75, 2.91)     Fertility preservation  Not  interested    Fu in 3 months w/low T fu labs      -Testosterone therapy will require intensive monitoring for toxicity including elevated estrogen, polycythemia (hematocrit) and prostate cancer (PSA) with tests in 3 months.       G 8400  Visit complexity inherent to evaluation and management (E&M) associated with medical care services that serve as the continuing focal point for all needed health care services and/or with medical care services that are part of ongoing care related to a patient's single, serious condition or a complex condition.      Scribe Attestation  By signing my name below, I, Kaden France   attest that this documentation has been prepared under the direction and in the presence of Rodriguez Fernandez MD.

## 2024-07-22 NOTE — PATIENT INSTRUCTIONS
Do labs today at any  lab  Testosterone and needles are being sent to [facility] in [place]  Follow up in 3 months with low T labs

## 2024-07-22 NOTE — PROGRESS NOTES
"Subjective   HPI:  59 y.o. yo male patient complains of  symptoms consistent with hypogonadism.    Previous use of testosterone: {YES-DESCRIBE/NO:90270::\"No\"}    These include:  Decreased libido:   Decreased energy:  Decreased muscle mass:   Erectile Dysfunction:     Want to preserve fertility: No  Personal history of gynecomastia and/or concerns about the condition: MNo  Family / Personal History of Prostate Cancer: No  MI or Stroke: No    Lab Results   Component Value Date    TESTOSTERONE 273 05/14/2024    TESTOSTERONE 246 11/03/2023     No results found for: \"LH\"  No results found for: \"FSH\"  No components found for: \"ESTRADIAL\"  No components found for: \"CBC\"  No results found for: \"PROLACTIN\"      No components found for: \"PSA;3\"    PMH:  Past Medical History:   Diagnosis Date    Allergic rhinitis due to pollen     Hay fever    Bilateral plantar fasciitis 02/27/2024    Lactose intolerance, unspecified     Lactose intolerance    Pain in leg, unspecified 04/08/2015    Leg pain    Pain in unspecified ankle and joints of unspecified foot 04/08/2015    Ankle pain    Personal history of other diseases of the circulatory system 04/11/2016    History of essential hypertension    Personal history of other diseases of the respiratory system 04/08/2015    History of acute bronchitis        PSH:  Past Surgical History:   Procedure Laterality Date    ADENOIDECTOMY  05/12/2015    Adenoidectomy    CHOLECYSTECTOMY  05/12/2015    Cholecystectomy    ESOPHAGOGASTRODUODENOSCOPY  05/12/2015    Diagnostic Esophagogastroduodenoscopy    OTHER SURGICAL HISTORY  05/12/2015    Biopsy Tongue        Medications:    Current Outpatient Medications:     amphetamine-dextroamphetamine XR (Adderall XR) 20 mg 24 hr capsule, , Disp: , Rfl:     blood-glucose sensor (FreeStyle Madeleine 3 Sensor) device, Apply every 2 weeks and monitor blood sugar as instructed, Disp: 6 each, Rfl: 3    DULoxetine (Cymbalta) 60 mg DR capsule, Take 1 capsule (60 mg) by " mouth once daily in the morning., Disp: , Rfl:     lithium 150 mg capsule, Take 1 capsule (150 mg) by mouth 2 times a day with meals., Disp: , Rfl:     metFORMIN  mg 24 hr tablet, TAKE 1 TABLET (500 MG) BY MOUTH 2 TIMES A DAY WITH MEALS. DO NOT CRUSH, CHEW, OR SPLIT., Disp: 180 tablet, Rfl: 1    tamsulosin (Flomax) 0.4 mg 24 hr capsule, Take 1 capsule (0.4 mg) by mouth once daily., Disp: 90 capsule, Rfl: 3    zolpidem (Ambien) 5 mg tablet, Take 1 tablet (5 mg) by mouth as needed at bedtime., Disp: , Rfl:     Social Hx:  Social History     Socioeconomic History    Marital status:    Tobacco Use    Smoking status: Never    Smokeless tobacco: Never      [unfilled]    x:  family history is not on file.     Allergy:  No Known Allergies     Exam  NAD  Nonlabored breathing  Abd nondistended    Assessment/Plan:  I reviewed the common causes of hypogonadism with the patient. We will obtain a fasting, morning hypogonadal panel to determine if his hormonal axis is abnormal. We will evaluate his lab results and if needed, at that point we will begin treatment.     We discussed different modalities to treat hypogonadism, including hcg, anastrozole, clomiphene, testosterone gels/creams, nasal testosterone, testosterone pellets, and testosterone injections. I also reviewed with him common risks following testosterone replacement, including cholesterol imbalance, polycythemia, cancer progression and infertility.  He understands these risks and wishes to proceed. The patient is to adhere to a strict followup either every 3-4 months or biannually.  We reviewed with him with our office's consent form.     We also reviewed his fertility status and whether sperm production is a concern.  If so, HCG will be added.  We also discussed the possibility of gynecomastia secondary to testosterone therapy and methods of prevention and/or treatment if this occurs.    Hypogonadism  fasting hypogonadal panel and repeat total  T    Screening for polycythemia  Hct  prn phlebotomy for Hct>54    Screening for hyperestrogenemia  E2   arimidex if E is elevated    Prostate ca screening  psa    Fertility preservation  Not interested    Fu after bloodwork    Scribe Attestation  By signing my name below, I, Kaden France   attest that this documentation has been prepared under the direction and in the presence of Rodriguez Fernandez MD.

## 2024-08-12 ENCOUNTER — APPOINTMENT (OUTPATIENT)
Dept: GASTROENTEROLOGY | Facility: HOSPITAL | Age: 59
End: 2024-08-12
Payer: COMMERCIAL

## 2024-08-13 ENCOUNTER — APPOINTMENT (OUTPATIENT)
Dept: SURGERY | Facility: CLINIC | Age: 59
End: 2024-08-13
Payer: COMMERCIAL

## 2024-08-16 DIAGNOSIS — E11.9 TYPE 2 DIABETES MELLITUS WITHOUT COMPLICATION, WITHOUT LONG-TERM CURRENT USE OF INSULIN (MULTI): ICD-10-CM

## 2024-08-16 RX ORDER — METFORMIN HYDROCHLORIDE 500 MG/1
TABLET, EXTENDED RELEASE ORAL
Qty: 180 TABLET | Refills: 1 | Status: SHIPPED | OUTPATIENT
Start: 2024-08-16

## 2024-09-11 ENCOUNTER — APPOINTMENT (OUTPATIENT)
Dept: PRIMARY CARE | Facility: CLINIC | Age: 59
End: 2024-09-11
Payer: COMMERCIAL

## 2024-09-11 VITALS
SYSTOLIC BLOOD PRESSURE: 99 MMHG | BODY MASS INDEX: 28.66 KG/M2 | TEMPERATURE: 97.7 F | WEIGHT: 172 LBS | OXYGEN SATURATION: 97 % | DIASTOLIC BLOOD PRESSURE: 65 MMHG | HEIGHT: 65 IN | HEART RATE: 62 BPM

## 2024-09-11 DIAGNOSIS — E11.9 TYPE 2 DIABETES MELLITUS WITHOUT COMPLICATION, WITHOUT LONG-TERM CURRENT USE OF INSULIN (MULTI): Primary | ICD-10-CM

## 2024-09-11 DIAGNOSIS — B35.6 TINEA CRURIS: ICD-10-CM

## 2024-09-11 LAB — HBA1C MFR BLD: 6.2 % (ref 4.2–6.5)

## 2024-09-11 PROCEDURE — 3078F DIAST BP <80 MM HG: CPT | Performed by: INTERNAL MEDICINE

## 2024-09-11 PROCEDURE — 99213 OFFICE O/P EST LOW 20 MIN: CPT | Performed by: INTERNAL MEDICINE

## 2024-09-11 PROCEDURE — 83036 HEMOGLOBIN GLYCOSYLATED A1C: CPT | Mod: CLIA WAIVED TEST | Performed by: INTERNAL MEDICINE

## 2024-09-11 PROCEDURE — 3044F HG A1C LEVEL LT 7.0%: CPT | Performed by: INTERNAL MEDICINE

## 2024-09-11 PROCEDURE — 3008F BODY MASS INDEX DOCD: CPT | Performed by: INTERNAL MEDICINE

## 2024-09-11 PROCEDURE — 3074F SYST BP LT 130 MM HG: CPT | Performed by: INTERNAL MEDICINE

## 2024-09-11 PROCEDURE — 1036F TOBACCO NON-USER: CPT | Performed by: INTERNAL MEDICINE

## 2024-09-11 RX ORDER — DULOXETIN HYDROCHLORIDE 30 MG/1
30 CAPSULE, DELAYED RELEASE ORAL DAILY
Qty: 90 CAPSULE | Refills: 3 | Status: SHIPPED | OUTPATIENT
Start: 2024-09-11 | End: 2025-09-11

## 2024-09-11 RX ORDER — CLOTRIMAZOLE 1 %
CREAM (GRAM) TOPICAL 2 TIMES DAILY
Qty: 60 G | Refills: 2 | Status: SHIPPED | OUTPATIENT
Start: 2024-09-11 | End: 2024-09-25

## 2024-09-11 ASSESSMENT — PAIN SCALES - GENERAL: PAINLEVEL: 0-NO PAIN

## 2024-09-11 NOTE — PROGRESS NOTES
"Subjective   Yonis Murrell is a 59 y.o. male who presents for Follow-up.  HPI    Past medical history includes diabetes, anxiety/adjustment disorder and MALA.    Interim:  - urology, Dr. CLINTON; 7/22/2024, started on testosterone injections, RTC 3 months  Has been doing it for about 5 weeks, perhaps has a bit more energy.  Also stopped his ADHD medication.    Previously had prediabetes, on lab work in early November 2023 his A1c showed overt diabetes with a hemoglobin A1c of 8.5% improved with POC A1c at 6.4%.  Has made strides with diet and exercise; has room to continue improving.  Started on metformin 1/23/2024.  No adverse effects.  Met with Char, 5/17/2024, tracking carbs; reviewed CGM today and uploaded report.    He has no vision changes or blurriness, no numbness or tingling in the extremities, no polyuria or polydipsia.    Followed by psychiatry, helping a lot with mood, irritability.  Normal renal function.        **COPIED FORWARD FOR REFERENCE**    Continues on tamsulosin, typically up once per night; STABLE, and satisfactory.      Obstructive sleep apnea, Dr. Murtaza Ladd, now on CPAP, has significant improvement with his mood, his energy during the day, wakes up feeling more rested. Been using CPAP regularly since September 2015.      , 3 kids doing well.   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  Oldest daughter in [place], second daughter  and living in [facility], son going to [facility] [facility] [employer].  No EtOH, tobacco.     Providers:  GI-Dr. Frankel  Psychiatry-Dr. Jeremias Rodriguez  Dermatology-Dr. Vieyra, not recently   Objective   BP 99/65 (BP Location: Left arm, Patient Position: Sitting, BP Cuff Size: Adult)   Pulse 62   Temp 36.5 °C (97.7 °F) (Temporal)   Ht 1.651 m (5' 5\")   Wt 78 kg (172 lb)   SpO2 97%   BMI 28.62 kg/m²    Physical Exam  Gen: NAD, pleasant, A&;Ox3  HEENT: PERRL, EOMI, MMM, OP clear  Neck: supple, no thyromegaly, no JVD, normal carotid upstroke  Pulm: lungs CTAB, good air movement  CV: RRR, no m/r/g, " "2+ DP pulses  Abd: NABS, soft, NT, ND no HSM  Ext: no peripheral edema  Neuro: CN II-XII intact, no focal sensory or motor deficits, normal reflexes    Lab Results   Component Value Date    HGBA1C 6.4 05/29/2024      No results found for: \"ALBUR\", \"[id number]\"   Lab Results   Component Value Date    CHOL 127 11/03/2023     Lab Results   Component Value Date    HDL 28.1 11/03/2023     Lab Results   Component Value Date    LDLCALC 57 11/03/2023     Lab Results   Component Value Date    TRIG 208 (H) 11/03/2023     No components found for: \"CHOLHDL\"  Lab Results   Component Value Date    CREATININE 0.78 05/14/2024          Assessment/Plan     NIDDM: Hemoglobin A1c 8.5%, 11/3/2023 -->6.4%  on 5/29/2024  - continue Metformin 500mg BID  -Extensive discussion as above and with RD  -continue monitoring with Libre3  - Lipids never an issue but probably would start low dose given DM  - follow-up in 3 months    Low testosterone/BPH:    - saw urology, Dr. CLINTON; 7/22/2024, started on testosterone injections, RTC 3 months  - cont Flomax,     **COPIED FORWARD FOR REFERENCE**  CV:  -EKG is reassuring, low LDL measured 7/28/2022, no concerning family or personal history  -continue working on diet and exercise  -Discuss statin treatment given diabetes    Psych: anxiety and depression  - seeing Dr Jeremias Rodriguez, psychiatry  - seeing Sukhdev Owen, therapy  - planning on CBT-I therapy at [facility]  - med rec updated     MALA/History of hypertension: Off meds, no hypertension since being on CPAP; using regularly, helps with sleep quality and energy, continue        Health maintenance  - recheck metabolics, has IFG  -Last colonoscopy: 12/3/2020, normal; repeat 5 years (hx of polyps)  - PSA: 3.75ng/mL JUL 2022  -Smoking history: Never  -Counseled regarding diet and exercise, patient has relatively good at home, discussed portion control, increase exercise  -Immunizations: current  -Followup for AWV  Problem List Items Addressed This Visit  "   None  Visit Diagnoses       Type 2 diabetes mellitus without complication, without long-term current use of insulin (Multi)    -  Primary    Relevant Orders    POCT Glycosylated Hemoglobin (HGB A1C) docked device                   Rocky Orozco MD

## 2024-09-30 ENCOUNTER — LAB (OUTPATIENT)
Dept: LAB | Facility: LAB | Age: 59
End: 2024-09-30
Payer: COMMERCIAL

## 2024-09-30 DIAGNOSIS — E11.9 TYPE 2 DIABETES MELLITUS WITHOUT COMPLICATION, WITHOUT LONG-TERM CURRENT USE OF INSULIN (MULTI): ICD-10-CM

## 2024-09-30 LAB
CREAT UR-MCNC: 206.9 MG/DL (ref 20–370)
EST. AVERAGE GLUCOSE BLD GHB EST-MCNC: 134 MG/DL
HBA1C MFR BLD: 6.3 %
MICROALBUMIN UR-MCNC: 14.1 MG/L
MICROALBUMIN/CREAT UR: 6.8 UG/MG CREAT

## 2024-09-30 PROCEDURE — 82570 ASSAY OF URINE CREATININE: CPT

## 2024-09-30 PROCEDURE — 83036 HEMOGLOBIN GLYCOSYLATED A1C: CPT

## 2024-09-30 PROCEDURE — 36415 COLL VENOUS BLD VENIPUNCTURE: CPT

## 2024-09-30 PROCEDURE — 82043 UR ALBUMIN QUANTITATIVE: CPT

## 2024-10-21 ENCOUNTER — LAB (OUTPATIENT)
Dept: LAB | Facility: LAB | Age: 59
End: 2024-10-21
Payer: COMMERCIAL

## 2024-10-21 DIAGNOSIS — Z12.5 PROSTATE CANCER SCREENING: ICD-10-CM

## 2024-10-21 DIAGNOSIS — E11.9 TYPE 2 DIABETES MELLITUS WITHOUT COMPLICATION, WITHOUT LONG-TERM CURRENT USE OF INSULIN (MULTI): ICD-10-CM

## 2024-10-21 DIAGNOSIS — E29.1 HYPOGONADISM IN MALE: ICD-10-CM

## 2024-10-21 LAB
CHOLEST SERPL-MCNC: 119 MG/DL (ref 0–199)
CHOLESTEROL/HDL RATIO: 3.7
ESTRADIOL SERPL-MCNC: 59 PG/ML
HCT VFR BLD AUTO: 43.2 % (ref 41–52)
HDLC SERPL-MCNC: 31.9 MG/DL
LDLC SERPL CALC-MCNC: 57 MG/DL
LH SERPL-ACNC: 0.9 IU/L
NON HDL CHOLESTEROL: 87 MG/DL (ref 0–149)
PSA SERPL-MCNC: 3.43 NG/ML
TESTOST SERPL-MCNC: 231 NG/DL (ref 240–1000)
TRIGL SERPL-MCNC: 150 MG/DL (ref 0–149)
VLDL: 30 MG/DL (ref 0–40)

## 2024-10-21 PROCEDURE — 84403 ASSAY OF TOTAL TESTOSTERONE: CPT

## 2024-10-21 PROCEDURE — 84153 ASSAY OF PSA TOTAL: CPT

## 2024-10-21 PROCEDURE — 82670 ASSAY OF TOTAL ESTRADIOL: CPT

## 2024-10-21 PROCEDURE — 85014 HEMATOCRIT: CPT

## 2024-10-21 PROCEDURE — 83002 ASSAY OF GONADOTROPIN (LH): CPT

## 2024-10-21 PROCEDURE — 80061 LIPID PANEL: CPT

## 2024-10-21 PROCEDURE — 36415 COLL VENOUS BLD VENIPUNCTURE: CPT

## 2024-10-28 ENCOUNTER — APPOINTMENT (OUTPATIENT)
Dept: UROLOGY | Facility: HOSPITAL | Age: 59
End: 2024-10-28
Payer: COMMERCIAL

## 2024-10-28 DIAGNOSIS — R79.89 LOW TESTOSTERONE: ICD-10-CM

## 2024-10-28 DIAGNOSIS — E29.1 HYPOGONADISM MALE: ICD-10-CM

## 2024-10-28 DIAGNOSIS — E28.0 ESTROGEN EXCESS: ICD-10-CM

## 2024-10-28 DIAGNOSIS — E29.1 HYPOGONADISM IN MALE: Primary | ICD-10-CM

## 2024-10-28 DIAGNOSIS — Z12.5 PROSTATE CANCER SCREENING: ICD-10-CM

## 2024-10-28 PROCEDURE — 99214 OFFICE O/P EST MOD 30 MIN: CPT | Performed by: UROLOGY

## 2024-10-28 PROCEDURE — G2211 COMPLEX E/M VISIT ADD ON: HCPCS | Performed by: UROLOGY

## 2024-10-28 RX ORDER — ANASTROZOLE 1 MG/1
0.5 TABLET ORAL
Qty: 5 TABLET | Refills: 3 | Status: SHIPPED | OUTPATIENT
Start: 2024-10-28

## 2024-10-28 RX ORDER — TESTOSTERONE CYPIONATE 200 MG/ML
100 INJECTION, SOLUTION INTRAMUSCULAR
Qty: 5 ML | Refills: 3 | Status: SHIPPED | OUTPATIENT
Start: 2024-10-28

## 2024-10-28 RX ORDER — SYRINGE W-NEEDLE,DISPOSAB,3 ML 23GX1"
SYRINGE, EMPTY DISPOSABLE MISCELLANEOUS
Qty: 16 EACH | Refills: 3 | Status: SHIPPED | OUTPATIENT
Start: 2024-10-28

## 2024-10-30 ENCOUNTER — APPOINTMENT (OUTPATIENT)
Dept: PRIMARY CARE | Facility: CLINIC | Age: 59
End: 2024-10-30
Payer: COMMERCIAL

## 2024-10-30 VITALS
HEIGHT: 65 IN | TEMPERATURE: 97.2 F | OXYGEN SATURATION: 96 % | WEIGHT: 177.2 LBS | DIASTOLIC BLOOD PRESSURE: 81 MMHG | HEART RATE: 88 BPM | BODY MASS INDEX: 29.52 KG/M2 | SYSTOLIC BLOOD PRESSURE: 130 MMHG

## 2024-10-30 DIAGNOSIS — E11.9 TYPE 2 DIABETES MELLITUS WITHOUT COMPLICATION, WITHOUT LONG-TERM CURRENT USE OF INSULIN (MULTI): ICD-10-CM

## 2024-10-30 DIAGNOSIS — Z00.00 ENCOUNTER FOR WELLNESS EXAMINATION: Primary | ICD-10-CM

## 2024-10-30 PROCEDURE — 3048F LDL-C <100 MG/DL: CPT | Performed by: INTERNAL MEDICINE

## 2024-10-30 PROCEDURE — 3008F BODY MASS INDEX DOCD: CPT | Performed by: INTERNAL MEDICINE

## 2024-10-30 PROCEDURE — 99214 OFFICE O/P EST MOD 30 MIN: CPT | Performed by: INTERNAL MEDICINE

## 2024-10-30 PROCEDURE — 3044F HG A1C LEVEL LT 7.0%: CPT | Performed by: INTERNAL MEDICINE

## 2024-10-30 PROCEDURE — 3075F SYST BP GE 130 - 139MM HG: CPT | Performed by: INTERNAL MEDICINE

## 2024-10-30 PROCEDURE — 1036F TOBACCO NON-USER: CPT | Performed by: INTERNAL MEDICINE

## 2024-10-30 PROCEDURE — 3079F DIAST BP 80-89 MM HG: CPT | Performed by: INTERNAL MEDICINE

## 2024-10-30 PROCEDURE — 99396 PREV VISIT EST AGE 40-64: CPT | Performed by: INTERNAL MEDICINE

## 2024-10-30 PROCEDURE — 3061F NEG MICROALBUMINURIA REV: CPT | Performed by: INTERNAL MEDICINE

## 2024-10-30 PROCEDURE — 95251 CONT GLUC MNTR ANALYSIS I&R: CPT | Performed by: INTERNAL MEDICINE

## 2024-10-30 RX ORDER — METFORMIN HYDROCHLORIDE 500 MG/1
500 TABLET, EXTENDED RELEASE ORAL
Qty: 270 TABLET | Refills: 3 | Status: SHIPPED | OUTPATIENT
Start: 2024-10-30 | End: 2025-10-30

## 2024-11-07 ENCOUNTER — NUTRITION (OUTPATIENT)
Dept: PRIMARY CARE | Facility: CLINIC | Age: 59
End: 2024-11-07
Payer: COMMERCIAL

## 2024-11-07 DIAGNOSIS — E11.65 TYPE 2 DIABETES MELLITUS WITH HYPERGLYCEMIA, WITHOUT LONG-TERM CURRENT USE OF INSULIN: Primary | ICD-10-CM

## 2024-11-07 NOTE — PATIENT INSTRUCTIONS
Keep a food log for two weeks. Send me your typed food log at least 48 hours prior to your visit. My email is [email or web address].

## 2024-11-07 NOTE — PROGRESS NOTES
Nutrition: Follow-up     Reason for Nutrition Visit: Patient is a 58 y.o. male referred for T2DM. Referred on 12/22/23 by Dr. Rocky Orozco.     Nutrition Assessment    Food and Nutrient History: Pt presents for visit #3. Pt has concerns regarding more recent hyperglycemia. Feels Madeleine readings are not matching A1c. He states that he is more stressed at work compared to the summer but unsure if this is fully accounting for the increase in blood sugars. Does acknowledge that he stress eats but tries to choose higher protein options (nuts, yasso bars). Does have popcorn. Does not feel like he has had any major diet changes though. He notes that he is off his ADHD meds. We discussed that these meds often suppress appetite which could somewhat explain the differences he is seeing in blood glucose. Also did celebrate Sikh holidays so there may have been deviations in his diet that are not being accounted for.     Fluid Intake: herbal tea, water, seltzer  Alcohol: 0-1 drink per week  Dietary Considerations: keeps Kosher  Appetite: Normal  Food Allergy: none  Food Intolerance: some degree of lactose intolerance  GI Symptoms: none    Dentition: own  Cooking: Patient, Spouse/Significant Other  Grocery Shopping: Patient, Spouse/Significant Other  Dietary Supplements: none  Food Insecurity: Denies    Physical Activity: Inconsistent     Labs:  Lipid Panel trend:    Recent Labs     10/21/24  1016 11/03/23  0802 07/28/22  1451 07/22/21  1442 07/16/20  1528 07/08/19  0954   CHOL 119 127 117   < > 102 120   HDL 31.9 28.1 29.6*   < > 28.2* 30.7*   LDLCALC 57 57  --   --   --   --    LDLF  --   --  25  --  34 44   VLDL 30 42* 62*  --  40 45*   TRIG 150* 208* 312*  --  201* 226*    < > = values in this interval not displayed.       A1c  6.3% 9/30/24  6.4% 5/29/24  7.1% 02/27/24  8.5% 11/03/23  6.0% 07/28/22    Diabetes:  Diagnosed end of 2023  Prior Nutrition Education: Yes  SMBG: Freestyle Madeleine 3, data reviewed   Hypoglycemia:  "none  Current DM Medications/Insulin Regimen:  - Metformin  mg TID    Nutrition Focused Physical Exam:  Performed/Deferred: Deferred as pt visually appears well-nourished with no signs of malnutrition      Past Medical History:  Patient Active Problem List   Diagnosis    BPH associated with nocturia    Adjustment disorder with anxiety    Obstructive sleep apnea    Bronchitis    Acute cough        Anthropometrics:  Ht Readings from Last 1 Encounters:   02/12/24 1.651 m (5' 5\")     BMI Readings from Last 1 Encounters:   10/30/24 29.49 kg/m²     Wt Readings from Last 10 Encounters:   10/30/24 80.4 kg (177 lb 3.2 oz)   09/11/24 78 kg (172 lb)   05/29/24 76.7 kg (169 lb)   02/27/24 78.9 kg (174 lb)   01/26/24 78.9 kg (174 lb)   12/22/23 80.7 kg (178 lb)   10/25/23 82.6 kg (182 lb)   10/13/22 80.3 kg (177 lb)   09/14/22 80.7 kg (178 lb)   07/28/22 79.8 kg (176 lb)     Wt Change: +5 lbs (3%) x 1.5 months     Estimated Energy Needs:    Total Energy Estimated Needs (kCal): 1800 kCal   Method for Estimating Needs: 1546 x 1.3 - 250 (for weight loss)   Total Protein Estimated Needs (g): 80 g   Total Protein Estimated Needs (g/kg): 1 g/kg    Nutrition Diagnosis     Patient has Malnutrition Diagnosis: No        Patient has Nutrition Diagnosis: Yes Diagnosis Status (1): ongoing  Nutrition Diagnosis 1: Altered nutrition related to laboratory values Related to (1): DM As Evidenced by (1): A1c = 8.5% (11/3/23)   Update: A1c = 6.3% (9/30/24)       Nutrition Interventions/Recommendations   Meals & Snacks: Carbohydrate-modified diet, Energy-modified diet, Fiber-modified diet, Modify Composition of Meals/Snacks, General Healthful Diet, Protein-modified diet    Nutrition Education:  - Pt to keep 2 week food log and email it to me prior to next visit. We will review CGM data and log together.     Nutrition Monitoring and Evaluation   Consistent meal/snack pattern - did not assess  Intentional weight loss of 0.5-2 lb per week, " trending toward a clinically significant weight loss of 5% of current body weight - not met, ongoing   Reduced TG < 150  - improved, ongoing   A1c: less than 7% - met, ongoing     Readiness to Change : Excellent  Level of Understanding : Excellent  Anticipated Compliant : Excellent     Follow-up: 11/25/24

## 2024-11-14 ENCOUNTER — APPOINTMENT (OUTPATIENT)
Dept: PRIMARY CARE | Facility: CLINIC | Age: 59
End: 2024-11-14
Payer: COMMERCIAL

## 2024-11-25 ENCOUNTER — APPOINTMENT (OUTPATIENT)
Dept: NUTRITION | Facility: HOSPITAL | Age: 59
End: 2024-11-25
Payer: COMMERCIAL

## 2024-11-25 DIAGNOSIS — Z71.3 DIETARY COUNSELING AND SURVEILLANCE: Primary | ICD-10-CM

## 2024-11-25 DIAGNOSIS — E11.9 TYPE 2 DIABETES MELLITUS WITHOUT COMPLICATION, WITHOUT LONG-TERM CURRENT USE OF INSULIN (MULTI): ICD-10-CM

## 2024-11-25 PROCEDURE — 97803 MED NUTRITION INDIV SUBSEQ: CPT | Mod: 95

## 2024-11-25 NOTE — PROGRESS NOTES
"Nutrition: Follow-up     Reason for Nutrition Visit: Patient is a 58 y.o. male referred for T2DM. Referred on 12/22/23 by Dr. Rocky Orozco.     Nutrition Assessment    Food and Nutrient History: Pt presents for visit #4. Food log and CGM data were reviewed for 2 week period. Agree with pt that he does have to keep his carb intake low to prevent spikes. For example, 22g whole wheat bread spikes him into 200's.     Fluid Intake: herbal tea, water, seltzer  Alcohol: 0-1 drink per week  Dietary Considerations: keeps Kosher  Appetite: Normal  Food Allergy: none  Food Intolerance: some degree of lactose intolerance  GI Symptoms: none    Dentition: own  Cooking: Patient, Spouse/Significant Other  Grocery Shopping: Patient, Spouse/Significant Other  Dietary Supplements: none  Food Insecurity: Denies    Physical Activity: Inconsistent     Labs:  Lipid Panel trend:    Recent Labs     10/21/24  1016 11/03/23  0802 07/28/22  1451 07/22/21  1442 07/16/20  1528 07/08/19  0954   CHOL 119 127 117   < > 102 120   HDL 31.9 28.1 29.6*   < > 28.2* 30.7*   LDLCALC 57 57  --   --   --   --    LDLF  --   --  25  --  34 44   VLDL 30 42* 62*  --  40 45*   TRIG 150* 208* 312*  --  201* 226*    < > = values in this interval not displayed.       A1c  6.3% 9/30/24  6.4% 5/29/24  7.1% 02/27/24  8.5% 11/03/23  6.0% 07/28/22    Diabetes:  Diagnosed end of 2023  Prior Nutrition Education: Yes  SMBG: Freestyle Madeleine 3, data reviewed   Hypoglycemia: none  Current DM Medications/Insulin Regimen:  - Metformin  mg TID    Nutrition Focused Physical Exam:  Performed/Deferred: Deferred as pt visually appears well-nourished with no signs of malnutrition      Past Medical History:  Patient Active Problem List   Diagnosis    BPH associated with nocturia    Adjustment disorder with anxiety    Obstructive sleep apnea    Bronchitis    Acute cough        Anthropometrics:  Ht Readings from Last 1 Encounters:   02/12/24 1.651 m (5' 5\")     BMI Readings " from Last 1 Encounters:   10/30/24 29.49 kg/m²     Wt Readings from Last 10 Encounters:   10/30/24 80.4 kg (177 lb 3.2 oz)   09/11/24 78 kg (172 lb)   05/29/24 76.7 kg (169 lb)   02/27/24 78.9 kg (174 lb)   01/26/24 78.9 kg (174 lb)   12/22/23 80.7 kg (178 lb)   10/25/23 82.6 kg (182 lb)   10/13/22 80.3 kg (177 lb)   09/14/22 80.7 kg (178 lb)   07/28/22 79.8 kg (176 lb)     No update since last visit     Estimated Energy Needs:    Total Energy Estimated Needs (kCal): 1800 kCal   Method for Estimating Needs: 1546 x 1.3 - 250 (for weight loss)   Total Protein Estimated Needs (g): 80 g   Total Protein Estimated Needs (g/kg): 1 g/kg    Nutrition Diagnosis     Patient has Malnutrition Diagnosis: No        Patient has Nutrition Diagnosis: Yes Diagnosis Status (1): ongoing  Nutrition Diagnosis 1: Altered nutrition related to laboratory values Related to (1): DM As Evidenced by (1): A1c = 8.5% (11/3/23)   Update: A1c = 6.3% (9/30/24)       Nutrition Interventions/Recommendations   Meals & Snacks: Carbohydrate-modified diet, Energy-modified diet, Fiber-modified diet, Modify Composition of Meals/Snacks, General Healthful Diet, Protein-modified diet    Nutrition Education:  Discussed carb reduction at meals and increased protein.   Encouraged additional exercise, especially near meal times.     Nutrition Monitoring and Evaluation   Consistent meal/snack pattern - did not assess  Intentional weight loss of 0.5-2 lb per week, trending toward a clinically significant weight loss of 5% of current body weight - not met, ongoing   Reduced TG < 150  - improved, ongoing   A1c: less than 7% - met, ongoing     Readiness to Change : Excellent  Level of Understanding : Excellent  Anticipated Compliant : Excellent     Follow-up: February

## 2024-11-26 DIAGNOSIS — E11.65 TYPE 2 DIABETES MELLITUS WITH HYPERGLYCEMIA, WITHOUT LONG-TERM CURRENT USE OF INSULIN: Primary | ICD-10-CM

## 2024-11-26 DIAGNOSIS — E66.09 CLASS 1 OBESITY DUE TO EXCESS CALORIES WITHOUT SERIOUS COMORBIDITY WITH BODY MASS INDEX (BMI) OF 30.0 TO 30.9 IN ADULT: ICD-10-CM

## 2024-11-26 DIAGNOSIS — E66.811 CLASS 1 OBESITY DUE TO EXCESS CALORIES WITHOUT SERIOUS COMORBIDITY WITH BODY MASS INDEX (BMI) OF 30.0 TO 30.9 IN ADULT: ICD-10-CM

## 2024-11-26 NOTE — PROGRESS NOTES
Subjective   Patient ID: Yonis Murrell is a 59 y.o. male who presents for No chief complaint on file..      Objective   Physical Exam    There were no vitals taken for this visit.       Assessment/Plan         Rocky Orozco MD

## 2024-12-12 ENCOUNTER — LAB (OUTPATIENT)
Dept: LAB | Facility: LAB | Age: 59
End: 2024-12-12
Payer: COMMERCIAL

## 2024-12-12 DIAGNOSIS — Z12.5 PROSTATE CANCER SCREENING: ICD-10-CM

## 2024-12-12 DIAGNOSIS — R79.89 LOW TESTOSTERONE: ICD-10-CM

## 2024-12-12 DIAGNOSIS — E29.1 HYPOGONADISM IN MALE: ICD-10-CM

## 2024-12-12 LAB
ESTRADIOL SERPL-MCNC: <19 PG/ML
HCT VFR BLD AUTO: 45.6 % (ref 41–52)
LH SERPL-ACNC: 0.6 IU/L
PSA SERPL-MCNC: 4.16 NG/ML
TESTOST SERPL-MCNC: 417 NG/DL (ref 240–1000)

## 2024-12-12 PROCEDURE — 84153 ASSAY OF PSA TOTAL: CPT

## 2024-12-12 PROCEDURE — 85014 HEMATOCRIT: CPT

## 2024-12-12 PROCEDURE — 83002 ASSAY OF GONADOTROPIN (LH): CPT

## 2024-12-12 PROCEDURE — 84403 ASSAY OF TOTAL TESTOSTERONE: CPT

## 2024-12-12 PROCEDURE — 36415 COLL VENOUS BLD VENIPUNCTURE: CPT

## 2024-12-12 PROCEDURE — 82670 ASSAY OF TOTAL ESTRADIOL: CPT

## 2024-12-15 NOTE — PROGRESS NOTES
"Subjective   HPI:  59 y.o.  male patient with PMH of MALA, BPH, depression and T2DM who complains of  symptoms consistent with hypogonadism. He is specifically worried about whether low testosterone is affecting his energy level and depression.    Last Visit: 10/28/24  Hypogonadism  Will increase to 0.5ml weekly T injection (T came back low)  Start check fu labs day after injections in 2 months  Prostate ca screening  Psa stable 3.43 (3.75, 2.91)   #elevated estrogen    -- will start anastrozole 0.5mg weekly      Fu in 2 months w/low T fu labs    Today's Visit:    #Hypogonadism  --increased to 0.5 ml testosterone injections-- -feels better  - denies breast enlargement or tenderness   -denies s/e    #elevated E  -anastrozole 0.5mg weekly    #elevated psa  -no hx of   -on tamuslosin  -no prostate ca in family  -no blood in urine  Lab Results   Component Value Date    PSA 4.16 (H) 12/12/2024    PSA 3.43 10/21/2024    PSA 3.65 07/22/2024         Labs, personally reviewed:   Component      Latest Ref Rng 12/12/2024   LH      IU/L 0.6    Estradiol      pg/mL <19    PSA      <=4.00 ng/mL 4.16 (H)    Testosterone      240 - 1,000 ng/dL 417    HEMATOCRIT      41.0 - 52.0 % 45.6       Legend:  (H) High    Lab Results   Component Value Date    TESTOSTERONE 417 12/12/2024    TESTOSTERONE 231 (L) 10/21/2024    TESTOSTERONE 243 07/22/2024    TESTOSTERONE 273 05/14/2024    TESTOSTERONE 246 11/03/2023     Lab Results   Component Value Date    LH 0.6 12/12/2024     Lab Results   Component Value Date    ESTRADIOL <19 12/12/2024     Lab Results   Component Value Date    PSA 4.16 (H) 12/12/2024     Lab Results   Component Value Date    HCT 45.6 12/12/2024           No components found for: \"PSA;3\"    PMH:  Past Medical History:   Diagnosis Date    Allergic rhinitis due to pollen     Hay fever    Bilateral plantar fasciitis 02/27/2024    Lactose intolerance, unspecified     Lactose intolerance    Pain in leg, unspecified 04/08/2015    " "Leg pain    Pain in unspecified ankle and joints of unspecified foot 04/08/2015    Ankle pain    Personal history of other diseases of the circulatory system 04/11/2016    History of essential hypertension    Personal history of other diseases of the respiratory system 04/08/2015    History of acute bronchitis        PSH:  Past Surgical History:   Procedure Laterality Date    ADENOIDECTOMY  05/12/2015    Adenoidectomy    CHOLECYSTECTOMY  05/12/2015    Cholecystectomy    ESOPHAGOGASTRODUODENOSCOPY  05/12/2015    Diagnostic Esophagogastroduodenoscopy    OTHER SURGICAL HISTORY  05/12/2015    Biopsy Tongue        Medications:    Current Outpatient Medications:     anastrozole (Arimidex) 1 mg tablet, Take 0.5 tablets (0.5 mg total) by mouth 1 (one) time per week in the early morning.., Disp: 5 tablet, Rfl: 3    blood-glucose sensor (FreeStyle Madeleine 3 Sensor) device, Apply every 2 weeks and monitor blood sugar as instructed, Disp: 6 each, Rfl: 3    DULoxetine (Cymbalta) 30 mg DR capsule, Take 1 capsule (30 mg) by mouth once daily., Disp: 90 capsule, Rfl: 3    lithium 150 mg capsule, Take 1 capsule (150 mg) by mouth 2 times daily (morning and late afternoon)., Disp: , Rfl:     metFORMIN  mg 24 hr tablet, Take 1 tablet (500 mg) by mouth 3 times a day before meals. Do not crush, chew, or split., Disp: 270 tablet, Rfl: 3    needle, disp, 18 G 18 gauge x 1 1/2\" needle, Use for medication draw, Disp: 16 each, Rfl: 3    needle, disp, 18 G 18 gauge x 1 1/2\" needle, Use for medication draw weekly (testosterone), Disp: 16 each, Rfl: 3    syringe with needle (Monoject Syringe) 3 mL 23 x 1\" syringe, Use for IM injections, Disp: 16 each, Rfl: 3    syringe with needle (Monoject Syringe) 3 mL 23 x 1\" syringe, Use for IM injections weekly (testosterone), Disp: 16 each, Rfl: 3    tamsulosin (Flomax) 0.4 mg 24 hr capsule, Take 1 capsule (0.4 mg) by mouth once daily., Disp: 90 capsule, Rfl: 3    testosterone cypionate " (Depo-Testosterone) 200 mg/mL injection, Inject 0.4 mL (80 mg) into the muscle 1 (one) time per week., Disp: 5 mL, Rfl: 3    testosterone cypionate (Depo-Testosterone) 200 mg/mL injection, Inject 0.5 mL (100 mg) into the muscle 1 (one) time per week., Disp: 5 mL, Rfl: 3    Social Hx:  Social History     Socioeconomic History    Marital status:    Tobacco Use    Smoking status: Never    Smokeless tobacco: Never   Vaping Use    Vaping status: Never Used   Substance and Sexual Activity    Alcohol use: Never    Drug use: Never          FHx:  family history is not on file.     Allergy:  No Known Allergies     Exam  NAD  Nonlabored breathing  Abd nondistended    Assessment/Plan:    Hypogonadism  Continue  0.5ml weekly T injection (T came back low)      Elevated PSA: Discussed the different etiologies of elevated PSA, as well as the risks and benefits of screening. We discussed prostate cancer and both MRI and prostate biopsy.    prostate MRI  repeat psa  \    #elevated estrogen    -- Continue anastrozole 0.5mg weekly      Fu after    -Testosterone therapy will require intensive monitoring for toxicity including elevated estrogen, polycythemia (hematocrit) and prostate cancer (PSA) with tests in 3 months.     I have personally reviewed the OARRS report for this patient I have considered the risks of abuse, dependence, addiction and diversion. I believe that it is clinically appropriate for him to be prescribed this medication.”      G 8164  Visit complexity inherent to evaluation and management (E&M) associated with medical care services that serve as the continuing focal point for all needed health care services and/or with medical care services that are part of ongoing care related to a patient's single, serious condition or a complex condition.      Scribe Attestation  By signing my name below, IMasha Scribe   attest that this documentation has been prepared under the direction and in the presence  of Rodriguez Fernandez MD.

## 2024-12-18 ENCOUNTER — APPOINTMENT (OUTPATIENT)
Dept: UROLOGY | Facility: CLINIC | Age: 59
End: 2024-12-18
Payer: COMMERCIAL

## 2024-12-18 VITALS — TEMPERATURE: 96.2 F

## 2024-12-18 DIAGNOSIS — R97.20 ELEVATED PSA: ICD-10-CM

## 2024-12-18 DIAGNOSIS — E28.0 ESTROGEN EXCESS: ICD-10-CM

## 2024-12-18 DIAGNOSIS — E29.1 HYPOGONADISM IN MALE: Primary | ICD-10-CM

## 2024-12-18 PROCEDURE — G2211 COMPLEX E/M VISIT ADD ON: HCPCS | Performed by: UROLOGY

## 2024-12-18 PROCEDURE — 87086 URINE CULTURE/COLONY COUNT: CPT

## 2024-12-18 PROCEDURE — 1036F TOBACCO NON-USER: CPT | Performed by: UROLOGY

## 2024-12-18 PROCEDURE — 99214 OFFICE O/P EST MOD 30 MIN: CPT | Performed by: UROLOGY

## 2024-12-18 RX ORDER — ANASTROZOLE 1 MG/1
0.5 TABLET ORAL
Qty: 5 TABLET | Refills: 3 | Status: SHIPPED | OUTPATIENT
Start: 2024-12-18

## 2024-12-18 RX ORDER — TESTOSTERONE CYPIONATE 200 MG/ML
100 INJECTION, SOLUTION INTRAMUSCULAR
Qty: 5 ML | Refills: 3 | Status: SHIPPED | OUTPATIENT
Start: 2024-12-22

## 2024-12-18 ASSESSMENT — PAIN SCALES - GENERAL: PAINLEVEL_OUTOF10: 0-NO PAIN

## 2024-12-18 NOTE — PATIENT INSTRUCTIONS
Prostate MRI- call [phone] to schedule  PSA (bloodwork) at any  lab  Testosterone and Anastrazole were sent to the [facility] on file in [place]  Follow up in 1 month to review prostate MRI results

## 2024-12-19 LAB — BACTERIA UR CULT: NO GROWTH

## 2024-12-31 ENCOUNTER — APPOINTMENT (OUTPATIENT)
Dept: UROLOGY | Facility: CLINIC | Age: 59
End: 2024-12-31
Payer: COMMERCIAL

## 2025-01-02 ENCOUNTER — APPOINTMENT (OUTPATIENT)
Dept: RADIOLOGY | Facility: HOSPITAL | Age: 60
End: 2025-01-02
Payer: COMMERCIAL

## 2025-01-22 ENCOUNTER — HOSPITAL ENCOUNTER (OUTPATIENT)
Dept: RADIOLOGY | Facility: HOSPITAL | Age: 60
Discharge: HOME | End: 2025-01-22
Payer: COMMERCIAL

## 2025-01-22 ENCOUNTER — LAB (OUTPATIENT)
Dept: LAB | Facility: LAB | Age: 60
End: 2025-01-22
Payer: COMMERCIAL

## 2025-01-22 DIAGNOSIS — E11.9 TYPE 2 DIABETES MELLITUS WITHOUT COMPLICATION, WITHOUT LONG-TERM CURRENT USE OF INSULIN (MULTI): ICD-10-CM

## 2025-01-22 DIAGNOSIS — E29.1 HYPOGONADISM IN MALE: ICD-10-CM

## 2025-01-22 DIAGNOSIS — E28.0 ESTROGEN EXCESS: ICD-10-CM

## 2025-01-22 DIAGNOSIS — R97.20 ELEVATED PSA: ICD-10-CM

## 2025-01-22 LAB
EST. AVERAGE GLUCOSE BLD GHB EST-MCNC: 126 MG/DL
HBA1C MFR BLD: 6 %
PSA SERPL-MCNC: 3.79 NG/ML

## 2025-01-22 PROCEDURE — 83036 HEMOGLOBIN GLYCOSYLATED A1C: CPT

## 2025-01-22 PROCEDURE — 2550000001 HC RX 255 CONTRASTS: Performed by: UROLOGY

## 2025-01-22 PROCEDURE — 72197 MRI PELVIS W/O & W/DYE: CPT

## 2025-01-22 PROCEDURE — 76498 UNLISTED MR PROCEDURE: CPT | Performed by: RADIOLOGY

## 2025-01-22 PROCEDURE — 72197 MRI PELVIS W/O & W/DYE: CPT | Performed by: RADIOLOGY

## 2025-01-22 PROCEDURE — A9575 INJ GADOTERATE MEGLUMI 0.1ML: HCPCS | Performed by: UROLOGY

## 2025-01-22 PROCEDURE — 84153 ASSAY OF PSA TOTAL: CPT

## 2025-01-22 RX ORDER — GADOTERATE MEGLUMINE 376.9 MG/ML
15 INJECTION INTRAVENOUS
Status: COMPLETED | OUTPATIENT
Start: 2025-01-22 | End: 2025-01-22

## 2025-01-22 RX ADMIN — GADOTERATE MEGLUMINE 15 ML: 376.9 INJECTION INTRAVENOUS at 18:47

## 2025-01-28 NOTE — PROGRESS NOTES
"Subjective   HPI:  59 y.o.  male patient with PMH of MALA, BPH, depression and T2DM who complains of  symptoms consistent with hypogonadism. He is specifically worried about whether low testosterone is affecting his energy level and depression.    Last Visit: 12/18/24  Hypogonadism  Continue  0.5ml weekly T injection (T came back low)  Elevated PSA:   prostate MRI  repeat psa  #elevated estrogen    -- Continue anastrozole 0.5mg weekly    Today's Visit:    #Hypogonadism  --TC 0.5cc weekly-- -feels better  - denies breast enlargement or tenderness   -denies s/e    #elevated E  -anastrozole 0.5mg weekly    #elevated psa  -on tamuslosin  -no prostate ca in family  -no blood in urine  Lab Results   Component Value Date    PSA 3.79 01/22/2025    PSA 4.16 (H) 12/12/2024    PSA 3.43 10/21/2024     Prostate MRI, 1/22/25, personally reviewed/interpreted:  IMPRESSION:  1. A 0.9 cm ovoid T2 hypointense focal lesion is noted in the left lateral peripheral zone within the prostatic mid gland, showing focally restricted diffusion, consistent with a PI-RADS 4 lesion.  2. Findings of benign prostatic hyperplasia (PI-RADS 2).      Lab Results   Component Value Date    TESTOSTERONE 417 12/12/2024    TESTOSTERONE 231 (L) 10/21/2024    TESTOSTERONE 243 07/22/2024    TESTOSTERONE 273 05/14/2024    TESTOSTERONE 246 11/03/2023     Lab Results   Component Value Date    LH 0.6 12/12/2024     Lab Results   Component Value Date    ESTRADIOL <19 12/12/2024     Lab Results   Component Value Date    PSA 3.79 01/22/2025     Lab Results   Component Value Date    HCT 45.6 12/12/2024       No components found for: \"PSA;3\"    PMH:  Past Medical History:   Diagnosis Date    Allergic rhinitis due to pollen     Hay fever    Bilateral plantar fasciitis 02/27/2024    Lactose intolerance, unspecified     Lactose intolerance    Pain in leg, unspecified 04/08/2015    Leg pain    Pain in unspecified ankle and joints of unspecified foot 04/08/2015    Ankle pain " "   Personal history of other diseases of the circulatory system 04/11/2016    History of essential hypertension    Personal history of other diseases of the respiratory system 04/08/2015    History of acute bronchitis        PSH:  Past Surgical History:   Procedure Laterality Date    ADENOIDECTOMY  05/12/2015    Adenoidectomy    CHOLECYSTECTOMY  05/12/2015    Cholecystectomy    ESOPHAGOGASTRODUODENOSCOPY  05/12/2015    Diagnostic Esophagogastroduodenoscopy    OTHER SURGICAL HISTORY  05/12/2015    Biopsy Tongue        Medications:    Current Outpatient Medications:     anastrozole (Arimidex) 1 mg tablet, Take 0.5 tablets (0.5 mg total) by mouth 1 (one) time per week in the early morning.., Disp: 5 tablet, Rfl: 3    blood-glucose sensor (FreeStyle Madeleine 3 Sensor) device, Apply every 2 weeks and monitor blood sugar as instructed, Disp: 6 each, Rfl: 3    DULoxetine (Cymbalta) 30 mg DR capsule, Take 1 capsule (30 mg) by mouth once daily., Disp: 90 capsule, Rfl: 3    lithium 150 mg capsule, Take 1 capsule (150 mg) by mouth 2 times daily (morning and late afternoon)., Disp: , Rfl:     metFORMIN  mg 24 hr tablet, Take 1 tablet (500 mg) by mouth 3 times a day before meals. Do not crush, chew, or split., Disp: 270 tablet, Rfl: 3    needle, disp, 18 G 18 gauge x 1 1/2\" needle, Use for medication draw, Disp: 16 each, Rfl: 3    needle, disp, 18 G 18 gauge x 1 1/2\" needle, Use for medication draw weekly (testosterone), Disp: 16 each, Rfl: 3    syringe with needle (Monoject Syringe) 3 mL 23 x 1\" syringe, Use for IM injections, Disp: 16 each, Rfl: 3    syringe with needle (Monoject Syringe) 3 mL 23 x 1\" syringe, Use for IM injections weekly (testosterone), Disp: 16 each, Rfl: 3    tamsulosin (Flomax) 0.4 mg 24 hr capsule, Take 1 capsule (0.4 mg) by mouth once daily., Disp: 90 capsule, Rfl: 3    testosterone cypionate (Depo-Testosterone) 200 mg/mL injection, Inject 0.4 mL (80 mg) into the muscle 1 (one) time per week., Disp: 5 " mL, Rfl: 3    testosterone cypionate (Depo-Testosterone) 200 mg/mL injection, Inject 0.5 mL (100 mg) into the muscle 1 (one) time per week., Disp: 5 mL, Rfl: 3    testosterone cypionate (Depo-Testosterone) 200 mg/mL injection, Inject 0.5 mL (100 mg) into the muscle 1 (one) time per week., Disp: 5 mL, Rfl: 3    Social Hx:  Social History     Socioeconomic History    Marital status:    Tobacco Use    Smoking status: Never    Smokeless tobacco: Never   Vaping Use    Vaping status: Never Used   Substance and Sexual Activity    Alcohol use: Never    Drug use: Never          FHx:  family history is not on file.     Allergy:  No Known Allergies     Exam  CONSTITUTIONAL:        No acute distress    HEAD:        Normocephalic and atraumatic    CHEST / RESPIRATORY      no excess work of breathing, no respiratory distress,    ABDOMEN / GASTROINTESTINAL:        Abdomen nondistended       Assessment/Plan:    #Hypogonadism  - continue TC 0.5cc weekly for now , discussed r/b/a of T therapy in setting of potential ca    Elevated PSA: Discussed the different etiologies of elevated PSA, as well as the risks and benefits of screening. We discussed prostate cancer and both MRI and prostate biopsy. I counseled the patient regarding risk of infection, bleeding, etc from prostate biopsy.         -Will refer to Dr. Benson for further management    -we discussed that if he does end up being dx with prostate CA, we may need to hold his testosterone therapy until it is treated    #elevated estrogen    -- Continue anastrozole 0.5mg weekly      Fu after biopsy reviewed with Dr. Benson    -Testosterone therapy will require intensive monitoring for toxicity including elevated estrogen, polycythemia (hematocrit) and prostate cancer (PSA) with tests in 3 months.     I have personally reviewed the OARRS report for this patient I have considered the risks of abuse, dependence, addiction and diversion. I believe that it is clinically appropriate  for him to be prescribed this medication.”      G 0148  Visit complexity inherent to evaluation and management (E&M) associated with medical care services that serve as the continuing focal point for all needed health care services and/or with medical care services that are part of ongoing care related to a patient's single, serious condition or a complex condition.      Scribe Attestation  By signing my name below, Masha BAZAN Scribe   attest that this documentation has been prepared under the direction and in the presence of Rodriguez Fernandez MD.

## 2025-01-29 ENCOUNTER — OFFICE VISIT (OUTPATIENT)
Dept: UROLOGY | Facility: CLINIC | Age: 60
End: 2025-01-29
Payer: COMMERCIAL

## 2025-01-29 ENCOUNTER — APPOINTMENT (OUTPATIENT)
Dept: PRIMARY CARE | Facility: CLINIC | Age: 60
End: 2025-01-29
Payer: COMMERCIAL

## 2025-01-29 VITALS
WEIGHT: 178 LBS | BODY MASS INDEX: 29.66 KG/M2 | OXYGEN SATURATION: 95 % | TEMPERATURE: 96.2 F | HEIGHT: 65 IN | DIASTOLIC BLOOD PRESSURE: 73 MMHG | HEART RATE: 79 BPM | SYSTOLIC BLOOD PRESSURE: 113 MMHG

## 2025-01-29 VITALS — TEMPERATURE: 96.6 F

## 2025-01-29 DIAGNOSIS — N40.1 BPH ASSOCIATED WITH NOCTURIA: ICD-10-CM

## 2025-01-29 DIAGNOSIS — E29.1 HYPOGONADISM IN MALE: ICD-10-CM

## 2025-01-29 DIAGNOSIS — R35.1 BPH ASSOCIATED WITH NOCTURIA: ICD-10-CM

## 2025-01-29 DIAGNOSIS — E28.0 ESTROGEN EXCESS: ICD-10-CM

## 2025-01-29 DIAGNOSIS — R97.20 ELEVATED PSA: Primary | ICD-10-CM

## 2025-01-29 DIAGNOSIS — E11.9 TYPE 2 DIABETES MELLITUS WITHOUT COMPLICATION, WITHOUT LONG-TERM CURRENT USE OF INSULIN (MULTI): Primary | ICD-10-CM

## 2025-01-29 PROCEDURE — 3074F SYST BP LT 130 MM HG: CPT | Performed by: INTERNAL MEDICINE

## 2025-01-29 PROCEDURE — 3008F BODY MASS INDEX DOCD: CPT | Performed by: INTERNAL MEDICINE

## 2025-01-29 PROCEDURE — 3044F HG A1C LEVEL LT 7.0%: CPT | Performed by: INTERNAL MEDICINE

## 2025-01-29 PROCEDURE — 1036F TOBACCO NON-USER: CPT | Performed by: UROLOGY

## 2025-01-29 PROCEDURE — G2211 COMPLEX E/M VISIT ADD ON: HCPCS | Performed by: UROLOGY

## 2025-01-29 PROCEDURE — 99213 OFFICE O/P EST LOW 20 MIN: CPT | Performed by: INTERNAL MEDICINE

## 2025-01-29 PROCEDURE — 1036F TOBACCO NON-USER: CPT | Performed by: INTERNAL MEDICINE

## 2025-01-29 PROCEDURE — 3078F DIAST BP <80 MM HG: CPT | Performed by: INTERNAL MEDICINE

## 2025-01-29 PROCEDURE — 99214 OFFICE O/P EST MOD 30 MIN: CPT | Performed by: UROLOGY

## 2025-01-29 ASSESSMENT — PAIN SCALES - GENERAL: PAINLEVEL_OUTOF10: 0-NO PAIN

## 2025-01-29 NOTE — PROGRESS NOTES
"Subjective   Yonis Murrell is a 59 y.o. male who presents for Follow-up.  HPI    Past medical history includes diabetes, anxiety/adjustment disorder and MALA.    Interim:  - urology, Dr. CLINTON; for treatment of hypogonadism, seen 1/29/2025, also referred for prostate biopsy due to elevated PSA and findings on prostate MRI    His CGM seems to run higher than his hemoglobin A1c suggests but reviewed and information overall is useful.  We increased the metformin at last visit and he has had no side effects and blood sugars remain well-controlled.    Understandably concerned about possible prostate cancer diagnosis, discussed.  Has plans to follow-up with his psychologist for further discussion.    **COPIED FORWARD FOR REFERENCE**      , 3 kids doing well.   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  Oldest daughter in [place], second daughter  and living in [place], son going to [facility] [employer].  No EtOH, tobacco.     Providers:  GI-Dr. Frankel  Psychiatry-Dr. Jeremias Rodriguez, pyschologist is Dr. Katja Bates  Dermatology-Dr. Vieyra, not recently  Sleep - Dr. Murtaza Ladd  Urology - Dr. Fernandez     Objective   /73 (BP Location: Left arm, Patient Position: Sitting, BP Cuff Size: Adult)   Pulse 79   Temp 35.7 °C (96.2 °F) (Temporal)   Ht 1.651 m (5' 5\")   Wt 80.7 kg (178 lb)   SpO2 95%   BMI 29.62 kg/m²    Physical Exam  Gen: NAD, pleasant, A&;Ox3  HEENT: PERRL, EOMI, MMM, OP clear  Neck: supple, no thyromegaly, no JVD, normal carotid upstroke  Pulm: lungs CTAB, good air movement  CV: RRR, no m/r/g, 2+ DP pulses  Abd: NABS, soft, NT, ND no HSM  Ext: no peripheral edema  Neuro: CN II-XII intact, no focal sensory or motor deficits, normal reflexes    Lab Results   Component Value Date    HGBA1C 6.0 (H) 01/22/2025      No results found for: \"ALBUR\", \"[id number]\"   Lab Results   Component Value Date    CHOL 119 10/21/2024     Lab Results   Component Value Date    HDL 31.9 10/21/2024     Lab Results   Component Value Date    " "LDLCALC 57 10/21/2024     Lab Results   Component Value Date    TRIG 150 (H) 10/21/2024     No components found for: \"CHOLHDL\"  Lab Results   Component Value Date    CREATININE 0.78 05/14/2024          Assessment/Plan     NIDDM: Well-controlled  - continue Metformin 500mg TID  -Extensive discussion and with RD previously  -continue monitoring with Libre3  - Lipids never an issue but probably would start low dose given DM, defer to next visit, consider CT cardiac score to further guide goals of treatment    Low testosterone/BPH:    - saw urology, Dr. CLINTON; 10/28/2024, increased dose of testosterone injections, RTC 2 months  - cont Flomax     Elevated PSA/PI-RADS on MRI prostate: Referred for prostate biopsy pending    MALA/History of hypertension: Off meds, no hypertension since being on CPAP; using regularly, helps with sleep quality and energy, continue        Health maintenance  -Last colonoscopy: 12/3/2020, normal; repeat 5 years (hx of polyps)  - PSA: 3.43ng/mL, 10/21/2024  -Smoking history: Never  -Counseled regarding diet and exercise, patient has relatively good at home, discussed portion control, increase exercise  -Immunizations: current  -Followup in 3 months;  AWV: 10/30/2024  Problem List Items Addressed This Visit    None               Rocky Orozco MD   "

## 2025-01-31 ENCOUNTER — TELEPHONE (OUTPATIENT)
Dept: UROLOGY | Facility: CLINIC | Age: 60
End: 2025-01-31
Payer: COMMERCIAL

## 2025-01-31 NOTE — TELEPHONE ENCOUNTER
Called to reschedule his upcoming procedure from 3/18 to 3/11/25. Change confirmed with this nurse.

## 2025-02-10 ENCOUNTER — TELEMEDICINE CLINICAL SUPPORT (OUTPATIENT)
Dept: NUTRITION | Facility: HOSPITAL | Age: 60
End: 2025-02-10
Payer: COMMERCIAL

## 2025-02-10 DIAGNOSIS — E11.9 TYPE 2 DIABETES MELLITUS WITHOUT COMPLICATION, WITHOUT LONG-TERM CURRENT USE OF INSULIN (MULTI): Primary | ICD-10-CM

## 2025-02-10 PROCEDURE — 97803 MED NUTRITION INDIV SUBSEQ: CPT | Mod: 95

## 2025-02-10 PROCEDURE — 97803 MED NUTRITION INDIV SUBSEQ: CPT

## 2025-02-10 NOTE — PROGRESS NOTES
Nutrition: Follow-up     Reason for Nutrition Visit: Patient is a 58 y.o. male referred for T2DM. Referred on 11/26/24 by Dr. Rocky Orozco.     Nutrition Assessment    Food and Nutrient History: Pt presents for visit #5. Pt's main concern today is the discrepancy between his A1c and CGM data. His A1c is at target but CGM data is showing postprandial hyperglycemia that does not align with A1c readings. This concerns him as he is not sure if he needs more aggressive DM treatment.     Fluid Intake: herbal tea, water, seltzer  Alcohol: 0-1 drink per week  Dietary Considerations: keeps Kosher  Appetite: Normal  Food Allergy: none  Food Intolerance: some degree of lactose intolerance  GI Symptoms: none    Dentition: own  Cooking: Patient, Spouse/Significant Other  Grocery Shopping: Patient, Spouse/Significant Other  Dietary Supplements: none  Food Insecurity: Denies    Physical Activity: Inconsistent     Labs:  Lipid Panel trend:    Recent Labs     10/21/24  1016 11/03/23  0802 07/28/22  1451 07/22/21  1442 07/16/20  1528 07/08/19  0954   CHOL 119 127 117   < > 102 120   HDL 31.9 28.1 29.6*   < > 28.2* 30.7*   LDLCALC 57 57  --   --   --   --    LDLF  --   --  25  --  34 44   VLDL 30 42* 62*  --  40 45*   TRIG 150* 208* 312*  --  201* 226*    < > = values in this interval not displayed.       A1c  6.0% 1/22/25  6.3% 9/30/24  6.4% 5/29/24  7.1% 02/27/24  8.5% 11/03/23  6.0% 07/28/22    Diabetes:  Diagnosed end of 2023  Prior Nutrition Education: Yes  SMBG: Freestyle Madeleine 3, data reviewed   Hypoglycemia: none  Current DM Medications/Insulin Regimen:  - Metformin  mg TID    Madeleine 3+  1/28/25-2/10/25  Average Glucose: 167 mg/dL  GMI: 7.3%  Glucose Variability: 21%    2% of time was spent > 250 mg/dL  27% of time was spent between 181-250 mg/dL  71% of time was spent between  mg/dL  0% of time was spent between 54-69 mg/dL   0% of time was spent < 54 mg/dL      Nutrition Focused Physical  "Exam:  Performed/Deferred: Deferred as pt visually appears well-nourished with no signs of malnutrition      Past Medical History:  Patient Active Problem List   Diagnosis    BPH associated with nocturia    Adjustment disorder with anxiety    Obstructive sleep apnea    Bronchitis    Acute cough        Anthropometrics:  Ht Readings from Last 1 Encounters:   02/12/24 1.651 m (5' 5\")     BMI Readings from Last 1 Encounters:   01/29/25 29.62 kg/m²     Wt Readings from Last 10 Encounters:   01/29/25 80.7 kg (178 lb)   10/30/24 80.4 kg (177 lb 3.2 oz)   09/11/24 78 kg (172 lb)   05/29/24 76.7 kg (169 lb)   02/27/24 78.9 kg (174 lb)   01/26/24 78.9 kg (174 lb)   12/22/23 80.7 kg (178 lb)   10/25/23 82.6 kg (182 lb)   10/13/22 80.3 kg (177 lb)   09/14/22 80.7 kg (178 lb)     Wt change: Wt stable since last visit     Estimated Energy Needs:    Total Energy Estimated Needs (kCal): 1800 kCal   Method for Estimating Needs: 1546 x 1.3 - 250 (for weight loss)   Total Protein Estimated Needs (g): 80 g   Total Protein Estimated Needs (g/kg): 1 g/kg    Nutrition Diagnosis     Patient has Malnutrition Diagnosis: No        Patient has Nutrition Diagnosis: Yes Diagnosis Status (1): ongoing  Nutrition Diagnosis 1: Altered nutrition related to laboratory values Related to (1): DM As Evidenced by (1): A1c = 8.5% (11/3/23)   Update: A1c = 6.0% (1/22/25)       Nutrition Interventions/Recommendations   Meals & Snacks: Carbohydrate-modified diet, Energy-modified diet, Fiber-modified diet, Modify Composition of Meals/Snacks, General Healthful Diet, Protein-modified diet    Nutrition Education:  Recommend purchasing glucometer over the counter. Discussed that primary care can also place order but it might be easier to go to [facility] and purchase supplies there.   Explained that CGM is measuring interstitial blood glucose and the glucometer is measuring blood glucose and is more accurate. Talked about verifying CGM readings with glucometer. " Discussed checking with glucometer when fasting and 2 hours postprandial. If checking right after meals, then we expect the CGM to not perfectly align with glucometer readings as there is a delay in accuracy of the interstitial blood glucose.   If glucometer readings corroborate CGM readings, then continue to work on diet and exercise. Discussed that he can also talk to primary about increasing Metformin or adding additional DM agent.      Nutrition Monitoring and Evaluation   Consistent meal/snack pattern - did not assess  Intentional weight loss of 0.5-2 lb per week, trending toward a clinically significant weight loss of 5% of current body weight - not met, ongoing   Reduced TG < 150  - improved, ongoing   A1c: less than 7% - met, ongoing     Readiness to Change : Excellent  Level of Understanding : Excellent  Anticipated Compliant : Excellent     Follow-up: 3 months; he will keep me posted via email regarding any interim questions

## 2025-02-13 DIAGNOSIS — R35.1 BPH ASSOCIATED WITH NOCTURIA: ICD-10-CM

## 2025-02-13 DIAGNOSIS — N40.1 BPH ASSOCIATED WITH NOCTURIA: ICD-10-CM

## 2025-02-13 RX ORDER — TAMSULOSIN HYDROCHLORIDE 0.4 MG/1
0.4 CAPSULE ORAL DAILY
Qty: 90 CAPSULE | Refills: 3 | Status: SHIPPED | OUTPATIENT
Start: 2025-02-13

## 2025-02-24 ENCOUNTER — LAB (OUTPATIENT)
Dept: LAB | Facility: HOSPITAL | Age: 60
End: 2025-02-24
Payer: COMMERCIAL

## 2025-02-24 ENCOUNTER — PRE-ADMISSION TESTING (OUTPATIENT)
Dept: PREADMISSION TESTING | Facility: HOSPITAL | Age: 60
End: 2025-02-24
Payer: COMMERCIAL

## 2025-02-24 VITALS
HEART RATE: 108 BPM | SYSTOLIC BLOOD PRESSURE: 124 MMHG | RESPIRATION RATE: 18 BRPM | HEIGHT: 65 IN | DIASTOLIC BLOOD PRESSURE: 81 MMHG | WEIGHT: 172.62 LBS | TEMPERATURE: 98.2 F | BODY MASS INDEX: 28.76 KG/M2 | OXYGEN SATURATION: 96 %

## 2025-02-24 DIAGNOSIS — R68.89 FLU-LIKE SYMPTOMS: ICD-10-CM

## 2025-02-24 DIAGNOSIS — N40.1 BPH ASSOCIATED WITH NOCTURIA: ICD-10-CM

## 2025-02-24 DIAGNOSIS — E29.1 HYPOGONADISM IN MALE: ICD-10-CM

## 2025-02-24 DIAGNOSIS — Z01.818 ENCOUNTER FOR OTHER PREPROCEDURAL EXAMINATION: Primary | ICD-10-CM

## 2025-02-24 DIAGNOSIS — R35.1 BPH ASSOCIATED WITH NOCTURIA: ICD-10-CM

## 2025-02-24 DIAGNOSIS — Z01.818 PRE-OP EVALUATION: Primary | ICD-10-CM

## 2025-02-24 DIAGNOSIS — R97.20 ELEVATED PSA: ICD-10-CM

## 2025-02-24 DIAGNOSIS — E28.0 ESTROGEN EXCESS: ICD-10-CM

## 2025-02-24 LAB
ANION GAP SERPL CALC-SCNC: 10 MMOL/L (ref 10–20)
BASOPHILS # BLD AUTO: 0.03 X10*3/UL (ref 0–0.1)
BASOPHILS NFR BLD AUTO: 0.3 %
BUN SERPL-MCNC: 13 MG/DL (ref 6–23)
CALCIUM SERPL-MCNC: 9.2 MG/DL (ref 8.6–10.3)
CHLORIDE SERPL-SCNC: 100 MMOL/L (ref 98–107)
CO2 SERPL-SCNC: 30 MMOL/L (ref 21–32)
CREAT SERPL-MCNC: 0.89 MG/DL (ref 0.5–1.3)
EGFRCR SERPLBLD CKD-EPI 2021: >90 ML/MIN/1.73M*2
EOSINOPHIL # BLD AUTO: 0.11 X10*3/UL (ref 0–0.7)
EOSINOPHIL NFR BLD AUTO: 1.1 %
ERYTHROCYTE [DISTWIDTH] IN BLOOD BY AUTOMATED COUNT: 13.4 % (ref 11.5–14.5)
FLUAV RNA RESP QL NAA+PROBE: NOT DETECTED
FLUBV RNA RESP QL NAA+PROBE: NOT DETECTED
GLUCOSE SERPL-MCNC: 172 MG/DL (ref 74–99)
HCT VFR BLD AUTO: 43.7 % (ref 41–52)
HGB BLD-MCNC: 15.7 G/DL (ref 13.5–17.5)
IMM GRANULOCYTES # BLD AUTO: 0.04 X10*3/UL (ref 0–0.7)
IMM GRANULOCYTES NFR BLD AUTO: 0.4 % (ref 0–0.9)
LYMPHOCYTES # BLD AUTO: 1.36 X10*3/UL (ref 1.2–4.8)
LYMPHOCYTES NFR BLD AUTO: 13 %
MCH RBC QN AUTO: 28.9 PG (ref 26–34)
MCHC RBC AUTO-ENTMCNC: 35.9 G/DL (ref 32–36)
MCV RBC AUTO: 80 FL (ref 80–100)
MONOCYTES # BLD AUTO: 1.03 X10*3/UL (ref 0.1–1)
MONOCYTES NFR BLD AUTO: 9.9 %
NEUTROPHILS # BLD AUTO: 7.88 X10*3/UL (ref 1.2–7.7)
NEUTROPHILS NFR BLD AUTO: 75.3 %
NRBC BLD-RTO: 0 /100 WBCS (ref 0–0)
PLATELET # BLD AUTO: 176 X10*3/UL (ref 150–450)
POTASSIUM SERPL-SCNC: 3.6 MMOL/L (ref 3.5–5.3)
RBC # BLD AUTO: 5.44 X10*6/UL (ref 4.5–5.9)
SARS-COV-2 RNA RESP QL NAA+PROBE: DETECTED
SODIUM SERPL-SCNC: 136 MMOL/L (ref 136–145)
WBC # BLD AUTO: 10.5 X10*3/UL (ref 4.4–11.3)

## 2025-02-24 PROCEDURE — 93005 ELECTROCARDIOGRAM TRACING: CPT

## 2025-02-24 PROCEDURE — 87636 SARSCOV2 & INF A&B AMP PRB: CPT

## 2025-02-24 PROCEDURE — 85025 COMPLETE CBC W/AUTO DIFF WBC: CPT

## 2025-02-24 PROCEDURE — 80048 BASIC METABOLIC PNL TOTAL CA: CPT

## 2025-02-24 RX ORDER — LISDEXAMFETAMINE DIMESYLATE 20 MG/1
20 CAPSULE ORAL EVERY MORNING
COMMUNITY
Start: 2025-01-30

## 2025-02-24 RX ORDER — IBUPROFEN 200 MG
400 TABLET ORAL EVERY 8 HOURS PRN
COMMUNITY

## 2025-02-24 ASSESSMENT — ENCOUNTER SYMPTOMS
CARDIOVASCULAR NEGATIVE: 1
NEUROLOGICAL NEGATIVE: 1
ENDOCRINE NEGATIVE: 1
MUSCULOSKELETAL NEGATIVE: 1
NECK NEGATIVE: 1
GASTROINTESTINAL NEGATIVE: 1
RESPIRATORY NEGATIVE: 1
EYES NEGATIVE: 1

## 2025-02-24 ASSESSMENT — DUKE ACTIVITY SCORE INDEX (DASI)
CAN YOU DO LIGHT WORK AROUND THE HOUSE LIKE DUSTING OR WASHING DISHES: YES
CAN YOU HAVE SEXUAL RELATIONS: YES
TOTAL_SCORE: 50.7
CAN YOU PARTICIPATE IN MODERATE RECREATIONAL ACTIVITIES LIKE GOLF, BOWLING, DANCING, DOUBLES TENNIS OR THROWING A BASEBALL OR FOOTBALL: YES
CAN YOU WALK INDOORS, SUCH AS AROUND YOUR HOUSE: YES
CAN YOU DO HEAVY WORK AROUND THE HOUSE LIKE SCRUBBING FLOORS OR LIFTING AND MOVING HEAVY FURNITURE: YES
DASI METS SCORE: 9
CAN YOU WALK A BLOCK OR TWO ON LEVEL GROUND: YES
CAN YOU PARTICIPATE IN STRENOUS SPORTS LIKE SWIMMING, SINGLES TENNIS, FOOTBALL, BASKETBALL, OR SKIING: NO
CAN YOU RUN A SHORT DISTANCE: YES
CAN YOU DO YARD WORK LIKE RAKING LEAVES, WEEDING OR PUSHING A MOWER: YES
CAN YOU CLIMB A FLIGHT OF STAIRS OR WALK UP A HILL: YES
CAN YOU TAKE CARE OF YOURSELF (EAT, DRESS, BATHE, OR USE TOILET): YES
CAN YOU DO MODERATE WORK AROUND THE HOUSE LIKE VACUUMING, SWEEPING FLOORS OR CARRYING GROCERIES: YES

## 2025-02-24 ASSESSMENT — PAIN SCALES - GENERAL: PAINLEVEL_OUTOF10: 0 - NO PAIN

## 2025-02-24 ASSESSMENT — PAIN - FUNCTIONAL ASSESSMENT: PAIN_FUNCTIONAL_ASSESSMENT: 0-10

## 2025-02-24 ASSESSMENT — LIFESTYLE VARIABLES: SMOKING_STATUS: NONSMOKER

## 2025-02-24 NOTE — CPM/PAT H&P
CPM/PAT Evaluation       Name: Yonis Murrell (Yonis Murrell)  /Age: 1965/59 y.o.     Visit Type:   In-Person       Chief Complaint: elevated PSA    HPI 60 yo male who is referred to PAT by Dr Benson for evaluation in advance of his prostate biopsy with navigation and US transrectal prostatae on 3/1/25. He has a h/o BPH,.  Lab Results   Component Value Date    PSA 3.79 2025    PSA 4.16 (H) 2024    PSA 3.43 10/21/2024     MRI 25 MRI A 0.9 cm ovoid T2 hypointense focal lesion is noted in the left lateral peripheral zone within the prostatic mid gland, showing focally restricted diffusion, consistent with a PI-RADS 4 lesion. 2. Findings of benign prostatic hyperplasia (PI-RADS 2).    See PMH  below    Past Medical History:   Diagnosis Date    Allergic rhinitis due to pollen     Hay fever    Anxiety     Bilateral plantar fasciitis 2024    BPH (benign prostatic hyperplasia)     Depression     Lactose intolerance, unspecified     Lactose intolerance    Pain in leg, unspecified 2015    Leg pain    Pain in unspecified ankle and joints of unspecified foot 2015    Ankle pain    Personal history of other diseases of the circulatory system 2016    History of essential hypertension    Personal history of other diseases of the respiratory system 2015    History of acute bronchitis    Type 2 diabetes mellitus        Past Surgical History:   Procedure Laterality Date    ADENOIDECTOMY  2015    Adenoidectomy    CHOLECYSTECTOMY  2015    Cholecystectomy    COLONOSCOPY      ESOPHAGOGASTRODUODENOSCOPY  2015    Diagnostic Esophagogastroduodenoscopy    OTHER SURGICAL HISTORY  2015    Biopsy Tongue       Patient Sexual activity questions deferred to the physician.    Family History   Problem Relation Name Age of Onset    Hyperlipidemia Mother      Diabetes Father      Tremor Father      Valvular heart disease Father      Atrial fibrillation Father      Arrhythmia  "Sister         No Known Allergies    Medication Documentation Review Audit       Reviewed by Stacy Faulkner, RN (Registered Nurse) on 02/24/25 at 1601      Medication Order Taking? Sig Documenting Provider Last Dose Status   anastrozole (Arimidex) 1 mg tablet [id number] Yes Take 0.5 tablets (0.5 mg total) by mouth 1 (one) time per week in the early morning.. Rodriguez Fernandez MD 2/16/2025 Active   blood-glucose sensor (FreeStyle Madeleine 3 Sensor) device [id number] Yes Apply every 2 weeks and monitor blood sugar as instructed Rocky Orozco MD 2/24/2025 Active   DULoxetine (Cymbalta) 30 mg DR capsule [id number] Yes Take 1 capsule (30 mg) by mouth once daily. Rocky Orozco MD 2/24/2025 Morning Active   ibuprofen 200 mg tablet [id number] Yes Take 2 tablets (400 mg) by mouth every 8 hours if needed for mild pain (1 - 3). Historical Provider, MD 2/24/2025 Noon Active   lisdexamfetamine (Vyvanse) 20 mg capsule [id number] Yes Take 1 capsule (20 mg) by mouth once daily in the morning. Historical Provider, MD 2/23/2025 Morning Active   lithium 150 mg capsule [id number] Yes Take 1 capsule (150 mg) by mouth 2 times daily (morning and late afternoon). Historical Provider, MD 2/24/2025 Morning Active   metFORMIN  mg 24 hr tablet [id number] Yes Take 1 tablet (500 mg) by mouth 3 times a day before meals. Do not crush, chew, or split. Rocky Orozco MD 2/24/2025 Morning Active   needle, disp, 18 G 18 gauge x 1 1/2\" needle [id number]  Use for medication draw Rodriguez Fernandez MD  Active   needle, disp, 18 G 18 gauge x 1 1/2\" needle [id number]  Use for medication draw weekly (testosterone) Rodriguez Fernandez MD  Active   syringe with needle (Monoject Syringe) 3 mL 23 x 1\" syringe [id number]  Use for IM injections Rodriguez Fernandez MD  Active   syringe with needle (Monoject Syringe) 3 mL 23 x 1\" syringe [id number]  Use for IM injections weekly (testosterone) Rodriguez Fernandez MD  Active   tamsulosin " (Flomax) 0.4 mg 24 hr capsule [id number] Yes TAKE 1 CAPSULE BY MOUTH ONCE DAILY. Rocky Orozco MD 2/24/2025 Morning Active   testosterone cypionate (Depo-Testosterone) 200 mg/mL injection [id number] No Inject 0.4 mL (80 mg) into the muscle 1 (one) time per week. Rodriguez Fernandez MD 2/16/2025 Active   testosterone cypionate (Depo-Testosterone) 200 mg/mL injection [id number] No Inject 0.5 mL (100 mg) into the muscle 1 (one) time per week. Rodriguez Fernandez MD 2/16/2025 Active   testosterone cypionate (Depo-Testosterone) 200 mg/mL injection [id number]  Inject 0.5 mL (100 mg) into the muscle 1 (one) time per week. Rodriguez Fernandez MD  Active                  PAT ROS:   Constitutional:    States he  has flu like symptoms that started on 2/23/25, he is afebrile  Neuro/Psych:   neg    Eyes:   neg    Ears:   neg    Nose:   neg    Mouth:   neg    Throat:   neg    Neck:   neg    Cardio:   neg    Respiratory:   neg    Endocrine:   neg    GI:   neg    :   neg    Musculoskeletal:   neg    Hematologic:   neg    Skin:  neg        Physical Exam  Vitals and nursing note reviewed.   Constitutional:       Appearance: He is ill-appearing.   HENT:      Head: Normocephalic.      Nose: Nose normal.      Mouth/Throat:      Mouth: Mucous membranes are moist.      Pharynx: Oropharynx is clear.   Eyes:      Extraocular Movements: Extraocular movements intact.      Conjunctiva/sclera: Conjunctivae normal.      Pupils: Pupils are equal, round, and reactive to light.   Cardiovascular:      Rate and Rhythm: Normal rate and regular rhythm.      Pulses: Normal pulses.      Heart sounds: Normal heart sounds.   Pulmonary:      Effort: Pulmonary effort is normal.      Breath sounds: Normal breath sounds.   Abdominal:      General: Bowel sounds are normal.      Palpations: Abdomen is soft.   Musculoskeletal:         General: Normal range of motion.      Cervical back: Normal range of motion and neck supple.   Skin:     General: Skin  "is warm and dry.      Capillary Refill: Capillary refill takes 2 to 3 seconds.   Neurological:      General: No focal deficit present.      Mental Status: He is oriented to person, place, and time.   Psychiatric:         Mood and Affect: Mood normal.          PAT AIRWAY:   Airway:     Mallampati::  III    TM distance::  >3 FB    Neck ROM::  Full    Visit Vitals  /81   Pulse 108   Temp 36.8 °C (98.2 °F) (Temporal)   Resp 18   Ht 1.651 m (5' 5\")   Wt 78.3 kg (172 lb 9.9 oz)   SpO2 96%   BMI 28.73 kg/m²   Smoking Status Never   BSA 1.89 m²       DASI Risk Score      Flowsheet Row Pre-Admission Testing [facility] 2/24/20[facility] in [facility]   Can you take care of yourself (eat, dress, bathe, or use toilet)?  2.75 filed at 02/24/2025 1657   Can you walk indoors, such as around your house? 1.75 filed at 02/24/2025 1657   Can you walk a block or two on level ground?  2.75 filed at 02/24/2025 1657   Can you climb a flight of stairs or walk up a hill? 5.5 filed at 02/24/2025 1657   Can you run a short distance? 8 filed at 02/24/2025 1657   Can you do light work around the house like dusting or washing dishes? 2.7 filed at 02/24/2025 1657   Can you do moderate work around the house like vacuuming, sweeping floors or carrying groceries? 3.5 filed at 02/24/2025 1657   Can you do heavy work around the house like scrubbing floors or lifting and moving heavy furniture?  8 filed at 02/24/2025 1657   Can you do yard work like raking leaves, weeding or pushing a mower? 4.5 filed at 02/24/2025 1657   Can you have sexual relations? 5.25 filed at 02/24/2025 1657   Can you participate in moderate recreational activities like golf, bowling, dancing, doubles tennis or throwing a baseball or football? 6 filed at 02/24/2025 1657   Can you participate in strenous sports like swimming, singles tennis, football, basketball, or skiing? 0 filed at 02/24/2025 1657   DASI SCORE 50.7 filed at 02/24/2025 1657   METS Score (Will be " calculated only when all the questions are answered) 9 filed at 02/24/2025 1657          Caprini DVT Assessment    No data to display       Modified Frailty Index    No data to display       CHADS2 Stroke Risk  Current as of 15 minutes ago        N/A 3 to 100%: High Risk   2 to < 3%: Medium Risk   0 to < 2%: Low Risk     Last Change: N/A          This score determines the patient's risk of having a stroke if the patient has atrial fibrillation.        This score is not applicable to this patient. Components are not calculated.          Revised Cardiac Risk Index      Flowsheet Row Pre-Admission Testing from 2/24/2025 in [facility]   High-Risk Surgery (Intraperitoneal, Intrathoracic,Suprainguinal vascular) 0 filed at 02/24/2025 1717   History of ischemic heart disease (History of MI, History of positive exercuse test, Current chest paint considered due to myocardial ischemia, Use of nitrate therapy, ECG with pathological Q Waves) 0 filed at 02/24/2025 1717   History of congestive heart failure (pulmonary edemia, bilateral rales or S3 gallop, Paroxysmal nocturnal dyspnea, CXR showing pulmonary vascular redistribution) 0 filed at 02/24/2025 1717   History of cerebrovascular disease (Prior TIA or stroke) 0 filed at 02/24/2025 1717   Pre-operative insulin treatment 0 filed at 02/24/2025 1717   Pre-operative creatinine>2 mg/dl 0 filed at 02/24/2025 1717   Revised Cardiac Risk Calculator 0 filed at 02/24/2025 1717          Apfel Simplified Score      Flowsheet Row Pre-Admission Testing from 2/24/2025 in [facility]   Smoking status 1 filed at 02/24/2025 1717   History of motion sickness or PONV  0 filed at 02/24/2025 1717   Use of postoperative opioids 0 filed at 02/24/2025 1717   Gender - Female 0=No filed at 02/24/2025 1717   Apfel Simplified Score Calculator 1 filed at 02/24/2025 1717          Risk Analysis Index Results This Encounter    No data found in the last 10 encounters.        Stop Bang Score      Flowsheet Row Pre-Admission Testing from 2/24/2025 in [facility]   Do you snore loudly? 0 filed at 02/24/2025 1604   Do you often feel tired or fatigued after your sleep? 1 filed at 02/24/2025 1604   Has anyone ever observed you stop breathing in your sleep? 1 filed at 02/24/2025 1604   Do you have or are you being treated for high blood pressure? 1 filed at 02/24/2025 1604   Recent BMI (Calculated) 28.7 filed at 02/24/2025 1604   Is BMI greater than 35 kg/m2? 0=No filed at 02/24/2025 1604   Age older than 50 years old? 1=Yes filed at 02/24/2025 1604   Is your neck circumference greater than 17 inches (Male) or 16 inches (Female)? 0 filed at 02/24/2025 1604   Gender - Male 1=Yes filed at 02/24/2025 1604   STOP-BANG Total Score 5 filed at 02/24/2025 1604          Prodigy: High Risk  Total Score: 8              Prodigy Gender Score          ARISCAT Score for Postoperative Pulmonary Complications      Flowsheet Row Pre-Admission Testing from 2/24/2025 in [facility]   Age Calculated Score 3 filed at 02/24/2025 1718   Preoperative SpO2 0 filed at 02/24/2025 1718   Respiratory infection in the last month Either upper or lower (i.e., URI, bronchitis, pneumonia), with fever and antibiotic treatment 17 filed at 02/24/2025 1718   Preoperative anemia (Hgb less than 10 g/dl) 0 filed at 02/24/2025 1718   Surgical incision  0 filed at 02/24/2025 1718   Duration of surgery  0 filed at 02/24/2025 1718   Emergency Procedure  0 filed at 02/24/2025 1718   ARISCAT Total Score  20 filed at 02/24/2025 1718          Moreira Perioperative Risk for Myocardial Infarction or Cardiac Arrest (MAYRA)      Flowsheet Row Pre-Admission Testing from 2/24/2025 in [facility]   Calculated Age Score 1.14 filed at 02/24/2025 1725   Functional Status  0 filed at 02/24/2025 1725   ASA Class  -5.17 filed at 02/24/2025 1725   Creatinine 0 filed at 02/24/2025 1725   Type of Procedure   -0.26 filed at 02/24/2025 1725   MAYRA Total Score  -9.54 filed at 02/24/2025 1725   MAYRA % 0.01 filed at 02/24/2025 1725        Assessment and Plan   58 yo male presents to [facility] for risk assessment and preoperative optimization in advance of  prostate biopsy    Today He is not feeling well today, body aches, congestion, appears fatigued. He is afebrile, slightly tachycardic, afebrile. Pulse ox is 96% on room air at rest    Neuro:  H/O of anxiety and adjustment dorder   Continue cymbalta and lithium as prescribed    HEENT/Airway:  No diagnosis or significant findings on chart review or clinical presentation and evaluation.     Cardiovascular:  Denies chest pain, shortness of breathe, dizziness, palpations, or lightheadedness    METS are  9, RCRI is 0 (3.9% risk for 30 day postoperative MACE)  MAYRA indicates a 0.01% risk of intraoperative or 30 day postoperative MAYRA  No additional preoperative testing is currently indicated.    EKG - 2/24/25 preliminary NSR abn. EKG ? Inf ischemia     Pulmonary:  MALA does not wear cpap  Ariscat score is 20     PRODIGY: Low risk for opioid induced respiratory depression  Discussed smoking cessation and deep breathing handout given    Renal:   No diagnosis or significant findings on chart review, or clinical presentation and evaluation.     Pt at Low risk for perioperative ARIE based on Dynamic Predictive Scoring Tool for Perioperative ARIE  Lab Results   Component Value Date    GLUCOSE 162 (H) 05/14/2024    CALCIUM 9.0 05/14/2024     05/14/2024    K 3.7 05/14/2024    CO2 27 05/14/2024     05/14/2024    BUN 16 05/14/2024    CREATININE 0.78 05/14/2024       Endocrine:  Diabetes  Continue Metformin as prescribed  Lab Results   Component Value Date    HGBA1C 6.0 (H) 01/22/2025       Hematologic:  No diagnosis or significant findings on chart review or clinical presentation and evaluation.  Lab Results   Component Value Date    WBC 6.5 11/03/2023    HGB 15.0 11/03/2023    HCT 45.6  12/12/2024    MCV 83 11/03/2023     11/03/2023       CAPRINI SCORE 5    Pt supplied education/VTE handout    Gastrointestinal:   No diagnosis or significant findings on chart review or clinical presentation and evaluation.   EAT-10 score of 0 - self-perceived oropharyngeal dysphagia scale (0-40)      :  See HPI    Infectious disease:   No diagnosis or significant findings on chart review or clinical presentation and evaluation.     Musculoskeletal:   No diagnosis or significant findings on chart review or clinical presentation and evaluation.   Last dose of NSAIDS 3/3/25  Instructed if  no issues with your liver you may take Tylenol 2-500 mg tablets every 8 hrs around the clock for pain including morning of surgery with a sip of water    Preoperative risk assessment  ASA II  NSQIP - Predicted length of stay days 0  PAT Testing - cbc, bmp, EKG, covid, influenza    His covid test came back + I notified Dr Benson and pt on 2/26/24    Anesthesia:  No anesthesia complications    denies dental issues  Smoker-denies  Drugs-denies  ETOH-rare  denies personal/family issues with Anesthesia    Face to Face patient contact time 45 min    GLENYS Waldrop-CNP 2/24/2025 5:28 PM

## 2025-02-24 NOTE — PREPROCEDURE INSTRUCTIONS
"   Medication List            Accurate as of February 24, 2025  4:32 PM. Always use your most recent med list.                anastrozole 1 mg tablet  Commonly known as: Arimidex  Take 0.5 tablets (0.5 mg total) by mouth 1 (one) time per week in the early morning..  Medication Adjustments for Surgery: Take/Use as prescribed     DULoxetine 30 mg DR capsule  Commonly known as: Cymbalta  Take 1 capsule (30 mg) by mouth once daily.  Medication Adjustments for Surgery: Take/Use as prescribed     FreeStyle Madeleine 3 Sensor device  Generic drug: blood-glucose sensor  Apply every 2 weeks and monitor blood sugar as instructed     ibuprofen 200 mg tablet  Additional Medication Adjustments for Surgery: Take last dose 7 days before surgery     lisdexamfetamine 20 mg capsule  Commonly known as: Vyvanse  Medication Adjustments for Surgery: Do Not take on the morning of surgery     lithium 150 mg capsule  Medication Adjustments for Surgery: Take/Use as prescribed     metFORMIN  mg 24 hr tablet  Commonly known as: Glucophage-XR  Take 1 tablet (500 mg) by mouth 3 times a day before meals. Do not crush, chew, or split.  Medication Adjustments for Surgery: Do Not take on the morning of surgery     * Monoject Syringe 3 mL 23 x 1\" syringe  Generic drug: syringe with needle  Use for IM injections     * Monoject Syringe 3 mL 23 x 1\" syringe  Generic drug: syringe with needle  Use for IM injections weekly (testosterone)     * needle (disp) 18 G 18 gauge x 1 1/2\" needle  Use for medication draw     * needle (disp) 18 G 18 gauge x 1 1/2\" needle  Use for medication draw weekly (testosterone)     tamsulosin 0.4 mg 24 hr capsule  Commonly known as: Flomax  TAKE 1 CAPSULE BY MOUTH ONCE DAILY.  Medication Adjustments for Surgery: Take/Use as prescribed     * testosterone cypionate 200 mg/mL injection  Commonly known as: Depo-Testosterone  Inject 0.4 mL (80 mg) into the muscle 1 (one) time per week.     * testosterone cypionate 200 mg/mL " injection  Commonly known as: Depo-Testosterone  Inject 0.5 mL (100 mg) into the muscle 1 (one) time per week.     * testosterone cypionate 200 mg/mL injection  Commonly known as: Depo-Testosterone  Inject 0.5 mL (100 mg) into the muscle 1 (one) time per week.           * This list has 7 medication(s) that are the same as other medications prescribed for you. Read the directions carefully, and ask your doctor or other care provider to review them with you.                GO TO LAB ASAP FOR LABS TESTING TO MARYSOL, BLOOD DRAW, COVID, AND INFLUENZA  [address] [place] [address]  Phone   [phone]  Fax   [phone]  [email or web address]  CALL PCP IF NOT FEELING BETTER  CALL DR DOUGHERTY'S OFFICE IF NOT FFEING BETTER      If no issues with your liver you may take Tylenol 2-500 mg tablets every 8 hrs around the clock for pain including morning of surgery with a sip of water    STOP VITAMINS, SUPPLEMENTS, HERBS, PROBIOTICS, AND FISH OIL, NO MOTRIN OR ADVIL OR ALEVE 7 DAYS BEFORE SURGERY    IF YOU HAVE A CPAP MACHINE BRING ON DAY OF SURGERY    Additional Instructions:  if not a joint replacement, body wash and mouth wash do not pertain to you     Seven/Six Days before Surgery:  Review your medication instructions, stop indicated medications  Five Days before Surgery:  Review your medication instructions, stop indicated medications  Three Days before Surgery:  Review your medication instructions, stop indicated medications  The Day before Surgery:  No smoking or alcohol use 24 hours before surgery  Review your medication instructions, stop indicated medications  You will be contacted regarding the time of your arrival to facility and surgery time  Do not eat any food after Midnight  Day of Surgery:  Review your medication instructions, take indicated medications  If you have diabetes, please check your fasting blood sugar upon awakening.  If fasting blood sugar is <80 mg/dl, drink 100 ml of  apple juice, time limit of 2 hours before  You may have clear liquids until TWO hours before surgery/procedure.  This includes water, black tea/coffee, (no milk or cream) apple juice and electrolyte drinks (Gatorade)  You may chew gum up to TWO hours before your surgery/procedure  Wear  comfortable loose fitting clothing  Do not use moisturizers, creams, lotions or perfume  All jewelry and valuables should be left at home     CONTACT SURGEON'S OFFICE IF YOU DEVELOP:  * Fever = 100.4 F   * New respiratory symptoms (e.g. cough, shortness of breath, respiratory distress, sore throat)  * Recent loss of taste or smell  *Flu like symptoms such as headache, fatigue or gastrointestinal symptoms  * You develop any open sores, shingles, burning or painful urination   AND/OR:  * You no longer wish to have the surgery.  * Any other personal circumstances change that may lead to the need to cancel or defer this surgery.  *You were admitted to any hospital within one week of your planned procedure.     SMOKING:  *Quitting smoking can make a huge difference to your health and recovery from surgery.    *If you need help with quitting, call 0-800-QUIT-NOW.     Preoperative Fasting Guidelines     Why must I stop eating and drinking near surgery time?  With sedation, food or liquid in your stomach can enter your lungs causing serious complications  Increases nausea and vomiting     When do I need to stop eating and drinking before my surgery?  Do not eat any food after midnight the night before your surgery/procedure.  You may have up to TEN ounces of clear liquid until TWO hours before your instructed arrival time to the hospital.  This includes water, black tea/coffee, (no milk or cream) apple juice, and electrolyte drinks (Gatorade)  You may chew gum until TWO hours before your surgery/procedure     SURGICAL TIME:  *You will be contacted between 11 am and 3 pm  the business day before your surgery with your arrival time.  *If you  haven't received a call by 3pm, call your surgeons office, do not rely on MyChart time as this is not confirmed until the day before surgery.  *Scheduled surgery times may change and you will be notified if this occurs-check your personal voicemail for any updates.     ON THE MORNING OF SURGERY:  *Wear comfortable, loose fitting clothing.   *Do not use moisturizers, creams, lotions or perfume.  *All jewelry and valuables should be left at home.  *Prosthetic devices such as contact lenses, hearing aids, dentures, eyelash extensions, hairpins and body piercing must be removed before surgery.     BRING WITH YOU:  *Photo ID and insurance card  *Current list of medications and allergies  *Pacemaker/Defibrillator/Heart stent cards  *CPAP machine and mask  *Slings/splints/crutches  *Copy of your complete Advanced Directive/DHPOA-if applicable  *Neurostimulator implant remote     PARKING AND ARRIVAL:  *Check in at the Main Entrance desk and let them know you are here for surgery.     IF YOU ARE HAVING OUTPATIENT/SAME DAY SURGERY:  *A responsible adult MUST accompany you at the time of discharge and stay with you for 24 hours after your surgery.  *You may NOT drive yourself home after surgery.  *You may use a taxi or ride sharing service ([employer], [employer]) to return home ONLY if you are accompanied by a friend or family member.  *Instructions for resuming your medications will be provided by your surgeon.     Thank you for coming to Pre Admission testing.      If I have prescribe medication please don't forget to  at your pharmacy.      Any questions about today's visit call [phone] and leave a message in the general mailbox.     Patient instructed to ambulate as soon as possible postoperatively to decrease thromboembolic risk.     Ciro Santos, APRN-CNP     Thank you for visiting [facility].  If you have any changes to your health condition, please call the surgeons office to alert them  and give them details of your symptoms.     Additional Instructions:      The Day before Surgery:  -Review your medication instructions, stop indicated medications  -You will be contacted in the evening regarding the time of your arrival to facility and surgery time     Day of Surgery:  -Review your medication instructions, take indicated medications  -Wear comfortable loose fitting clothing  -Do not use moisturizers, creams, lotions or perfume  -All jewelry and valuables should be left at home              Preoperative Brain Exercises     What are brain exercises?  A brain exercise is any activity that engages your thinking (cognitive) skills.     What types of activities are considered brain exercises?  Jigsaw puzzles, crossword puzzles, word jumble, memory games, word search, and many more.  Many can be found free online or on your phone via a mobile funmilayo.     Why should I do brain exercises before my surgery?  More recent research has shown brain exercise before surgery can lower the risk of postoperative delirium (confusion) which can be especially important for older adults.  Patients who did brain exercises for 5 to 10 minutes a day the days before surgery, cut their risk of postoperative delirium in half up to 1 week after surgery.                 [facility]     Preoperative Deep Breathing Exercises     Why it is important to do deep breathing exercises before my surgery?  Deep breathing exercises strengthen your breathing muscles.  This helps you to recover after your surgery and decreases the chance of breathing complications.      How are the deep breathing exercises done?  Sit straight with your back supported.  Breathe in deeply and slowly through your nose. Your lower rib cage should expand and your abdomen may move forward.  Hold that breath for 3 to 5 seconds.  Breathe out through pursed lips, slowly and completely.  Rest and repeat 10 times every hour while awake.  Rest  longer if you become dizzy or lightheaded.                      [facility]  Preoperative Deep Breathing Exercises     Why it is important to do deep breathing exercises before my surgery?  Deep breathing exercises strengthen your breathing muscles.  This helps you to recover after your surgery and decreases the chance of breathing complications.        How are the deep breathing exercises done?  Sit straight with your back supported.  Breathe in deeply and slowly through your nose. Your lower rib cage should expand and your abdomen may move forward.  Hold that breath for 3 to 5 seconds.  Breathe out through pursed lips, slowly and completely.  Rest and repeat 10 times every hour while awake.  Rest longer if you become dizzy or lightheaded.        Patient Information: Incentive Spirometer  What is an incentive spirometer?  An incentive spirometer is a device used before and after surgery to “exercise” your lungs.  It helps you to take deeper breaths to expand your lungs.  Below is an example of a basic incentive spirometer.  The device you receive may differ slightly but they all function the same.    Why do I need to use an incentive spirometer?  Using your incentive spirometer prepares your lungs for surgery and helps prevent lung problems after surgery.  How do I use my incentive spirometer?  When you're using your incentive spirometer, make sure to breathe through your mouth. If you breathe through your nose, the incentive spirometer won't work properly. You can hold your nose if you have trouble.  If you feel dizzy at any time, stop and rest. Try again at a later time.  Follow the steps below:  Set up your incentive spirometer, expand the flexible tubing and connect to the outlet.  Sit upright in a chair or bed. Hold the incentive spirometer at eye level.   Put the mouthpiece in your mouth and close your lips tightly around it. Slowly breathe out (exhale) completely.  Breathe in  (inhale) slowly through your mouth as deeply as you can. As you take a breath, you will see the piston rise inside the large column. While the piston rises, the indicator should move upwards. It should stay in between the 2 arrows (see Figure).  Try to get the piston as high as you can, while keeping the indicator between the arrows.   If the indicator doesn't stay between the arrows, you're breathing either too fast or too slow.  When you get it as high as you can, hold your breath for 10 seconds, or as long as possible. While you're holding your breath, the piston will slowly fall to the base of the spirometer.  Once the piston reaches the bottom of the spirometer, breathe out slowly through your mouth. Rest for a few seconds.  Repeat 10 times. Try to get the piston to the same level with each breath.  Repeat every hour while awake  You can carefully clean the outside of the mouthpiece with an alcohol wipe or soap and water.       Patient and Family Education        Ways You Can Help Prevent Blood Clots               This handout explains some simple things you can do to help prevent blood clots.      Blood clots are blockages that can form in the body's veins. When a blood clot forms in your deep veins, it may be called a deep vein thrombosis, or DVT for short. Blood clots can happen in any part of the body where blood flows, but they are most common in the arms and legs. If a piece of a blood clot breaks free and travels to the lungs, it is called a pulmonary embolus (PE). A PE can be a very serious problem.         Being in the hospital or having surgery can raise your chances of getting a blood clot because you may not be well enough to move around as much as you normally do.         Ways you can help prevent blood clots in the hospital           Wearing SCDs. SCDs stands for Sequential Compression Devices.   SCDs are special sleeves that wrap around your legs  They attach to a pump that fills them with air to  gently squeeze your legs every few minutes.   This helps return the blood in your legs to your heart.   SCDs should only be taken off when walking or bathing.   SCDs may not be comfortable, but they can help save your life.                                            Wearing compression stockings - if your doctor orders them. These special snug fitting stockings gently squeeze your legs to help blood flow.       Walking. Walking helps move the blood in your legs.   If your doctor says it is ok, try walking the halls at least   5 times a day. Ask us to help you get up, so you don't fall.      Taking any blood thinning medicines your doctor orders.        Page 1 of 2            [facility]; 3/23   Ways you can help prevent blood clots at home         Wearing compression stockings - if your doctor orders them. ? Walking - to help move the blood in your legs.       Taking any blood thinning medicines your doctor orders.      Signs of a blood clot or PE        Tell your doctor or nurse know right away if you have of the problems listed below.    If you are at home, seek medical care right away. Call 911 for chest pain or problems breathing.                Signs of a blood clot (DVT) - such as pain,  swelling, redness or warmth in your arm or leg      Signs of a pulmonary embolism (PE) - such as chest pain or feeling short of breath    GLENYS Waldrop-CNP  Thank you for visiting the [facility].  If you have any changes to your health condition, please call the surgeons office to alert them and give them details of your symptoms.

## 2025-02-26 NOTE — CPM/PAT PATIENT COMMUNICATION
CPM/PAT Patient Communication       Name: Yonis Murrell (Yonis Murrell)  /Age: 1965/59 y.o.     Pt notified he is + for COVID, he has been in touch with his PCP, He will contact Dr Benson's office about his surgery timeframe   Isabel Paulino, GLENYS-CNP

## 2025-03-06 ENCOUNTER — CLINICAL SUPPORT (OUTPATIENT)
Dept: PREADMISSION TESTING | Facility: HOSPITAL | Age: 60
End: 2025-03-06
Payer: COMMERCIAL

## 2025-03-06 ASSESSMENT — ENCOUNTER SYMPTOMS
VOMITING: 0
FEVER: 0
DIARRHEA: 0
CHILLS: 0
NAUSEA: 0
RHINORRHEA: 0
SHORTNESS OF BREATH: 0
EYE DISCHARGE: 0
WOUND: 0
ABDOMINAL PAIN: 0
COUGH: 0

## 2025-03-06 NOTE — CPM/PAT NURSE NOTE
CPM/PAT Nurse Note      Name: Yonis Murrell (Yonis Murrell)  /Age: 1965/59 y.o.       Past Medical History:   Diagnosis Date    ADHD (attention deficit hyperactivity disorder)     Allergic rhinitis due to pollen     Hay fever    Anxiety     Bilateral plantar fasciitis 2024    BPH (benign prostatic hyperplasia)     Depression     Lactose intolerance, unspecified     Lactose intolerance    Pain in leg, unspecified 2015    Leg pain    Pain in unspecified ankle and joints of unspecified foot 2015    Ankle pain    Personal history of other diseases of the circulatory system 2016    History of essential hypertension    Personal history of other diseases of the respiratory system 2015    History of acute bronchitis    Sleep apnea     Type 2 diabetes mellitus        Past Surgical History:   Procedure Laterality Date    ADENOIDECTOMY  2015    Adenoidectomy    CHOLECYSTECTOMY  2015    Cholecystectomy    COLONOSCOPY      ESOPHAGOGASTRODUODENOSCOPY  2015    Diagnostic Esophagogastroduodenoscopy    OTHER SURGICAL HISTORY  2015    Biopsy Tongue       Patient Sexual activity questions deferred to the physician.    Family History   Problem Relation Name Age of Onset    Hyperlipidemia Mother      Diabetes Father      Tremor Father      Valvular heart disease Father      Atrial fibrillation Father      Arrhythmia Sister         No Known Allergies    Prior to Admission medications    Medication Sig Start Date End Date Taking? Authorizing Provider   anastrozole (Arimidex) 1 mg tablet Take 0.5 tablets (0.5 mg total) by mouth 1 (one) time per week in the early morning.. 24  Yes Rodriguez Fernandez MD   DULoxetine (Cymbalta) 30 mg DR capsule Take 1 capsule (30 mg) by mouth once daily. 24 Yes Rocky Orozco MD   ibuprofen 200 mg tablet Take 2 tablets (400 mg) by mouth every 8 hours if needed for mild pain (1 - 3).   Yes Historical Provider, MD  "  lisdexamfetamine (Vyvanse) 20 mg capsule Take 1 capsule (20 mg) by mouth once daily in the morning. 1/30/25  Yes Historical Provider, MD   lithium 150 mg capsule Take 1 capsule (150 mg) by mouth 2 times daily (morning and late afternoon). 2/2/24  Yes Historical Provider, MD   metFORMIN  mg 24 hr tablet Take 1 tablet (500 mg) by mouth 3 times a day before meals. Do not crush, chew, or split. 10/30/24 10/30/25 Yes Rocky Orozco MD   tamsulosin (Flomax) 0.4 mg 24 hr capsule TAKE 1 CAPSULE BY MOUTH ONCE DAILY. 2/13/25  Yes Rocky Orozco MD   testosterone cypionate (Depo-Testosterone) 200 mg/mL injection Inject 0.4 mL (80 mg) into the muscle 1 (one) time per week. 7/22/24  Yes Rodriguez Fernandez MD   testosterone cypionate (Depo-Testosterone) 200 mg/mL injection Inject 0.5 mL (100 mg) into the muscle 1 (one) time per week. 10/28/24  Yes Rodriguez Fernandez MD   testosterone cypionate (Depo-Testosterone) 200 mg/mL injection Inject 0.5 mL (100 mg) into the muscle 1 (one) time per week. 12/22/24  Yes Rodriguez Fernandez MD   blood-glucose sensor (FreeStyle Madeleine 3 Sensor) device Apply every 2 weeks and monitor blood sugar as instructed 5/29/24   MD maurice Fajardo, disp, 18 G 18 gauge x 1 1/2\" needle Use for medication draw 7/22/24   Rodriguez Fernandez MD   needle, disp, 18 G 18 gauge x 1 1/2\" needle Use for medication draw weekly (testosterone) 10/28/24   Rodriguez Fernandez MD   syringe with needle (Monoject Syringe) 3 mL 23 x 1\" syringe Use for IM injections 7/22/24   Rodriguez Fernandez MD   syringe with needle (Monoject Syringe) 3 mL 23 x 1\" syringe Use for IM injections weekly (testosterone) 10/28/24   Rodriguez Fernandez MD        PAT ROS:   Constitutional:    no fever   no chills  Neuro/Psych:   Eyes:    no discharge  Ears:    no ear discharge  Nose:    no nasal discharge  Mouth:   Throat:    no throat pain  Neck:   Cardio:    no chest pain  Respiratory:    no cough   " no shortness of breath  Endocrine:   GI:    no abdominal pain   no diarrhea   no nausea   no vomiting  :   Musculoskeletal:   Hematologic:    no history of blood transfusion   no blood clots  Skin:   no sores/wound   no rash      DASI Risk Score      Flowsheet Row Pre-Admission Testing from 2/24/2025 in [facility]   Can you take care of yourself (eat, dress, bathe, or use toilet)?  2.75 filed at 02/24/2025 1657   Can you walk indoors, such as around your house? 1.75 filed at 02/24/2025 1657   Can you walk a block or two on level ground?  2.75 filed at 02/24/2025 1657   Can you climb a flight of stairs or walk up a hill? 5.5 filed at 02/24/2025 1657   Can you run a short distance? 8 filed at 02/24/2025 1657   Can you do light work around the house like dusting or washing dishes? 2.7 filed at 02/24/2025 1657   Can you do moderate work around the house like vacuuming, sweeping floors or carrying groceries? 3.5 filed at 02/24/2025 1657   Can you do heavy work around the house like scrubbing floors or lifting and moving heavy furniture?  8 filed at 02/24/2025 1657   Can you do yard work like raking leaves, weeding or pushing a mower? 4.5 filed at 02/24/2025 1657   Can you have sexual relations? 5.25 filed at 02/24/2025 1657   Can you participate in moderate recreational activities like golf, bowling, dancing, doubles tennis or throwing a baseball or football? 6 filed at 02/24/2025 1657   Can you participate in strenous sports like swimming, singles tennis, football, basketball, or skiing? 0 filed at 02/24/2025 1657   DASI SCORE 50.7 filed at 02/24/2025 1657   METS Score (Will be calculated only when all the questions are answered) 9 filed at 02/24/2025 1657          Caprini DVT Assessment    No data to display       Modified Frailty Index    No data to display       [id number] Stroke Risk Points  Current as of just now        N/A 0 to 9 Points:      Last Change: N/A          The [id number] risk  score (Lip GH, et al. 2009. © 2010 [employer]) quantifies the risk of stroke for a patient with atrial fibrillation. For patients without atrial fibrillation or under the age of 18 this score appears as N/A. Higher score values generally indicate higher risk of stroke.        This score is not applicable to this patient. Components are not calculated.          Revised Cardiac Risk Index      Flowsheet Row Pre-Admission Testing from 2/24/2025 in [facility]   High-Risk Surgery (Intraperitoneal, Intrathoracic,Suprainguinal vascular) 0 filed at 02/24/2025 1717   History of ischemic heart disease (History of MI, History of positive exercuse test, Current chest paint considered due to myocardial ischemia, Use of nitrate therapy, ECG with pathological Q Waves) 0 filed at 02/24/2025 1717   History of congestive heart failure (pulmonary edemia, bilateral rales or S3 gallop, Paroxysmal nocturnal dyspnea, CXR showing pulmonary vascular redistribution) 0 filed at 02/24/2025 1717   History of cerebrovascular disease (Prior TIA or stroke) 0 filed at 02/24/2025 1717   Pre-operative insulin treatment 0 filed at 02/24/2025 1717   Pre-operative creatinine>2 mg/dl 0 filed at 02/24/2025 1717   Revised Cardiac Risk Calculator 0 filed at 02/24/2025 1717          Apfel Simplified Score      Flowsheet Row Pre-Admission Testing from 2/24/2025 in [facility]   Smoking status 1 filed at 02/24/2025 1717   History of motion sickness or PONV  0 filed at 02/24/2025 1717   Use of postoperative opioids 0 filed at 02/24/2025 1717   Gender - Female 0=No filed at 02/24/2025 1717   Apfel Simplified Score Calculator 1 filed at 02/24/2025 1717          Risk Analysis Index Results This Encounter    No data found in the last 10 encounters.       Stop Bang Score      Flowsheet Row Clinical Support from 3/6/2025 in [facility] Pre-Admission Testing from 2/24/2025 in [facility]   Do you snore loudly? 0 filed at 03/06/2025 1536 0 filed at 02/24/2025 1604   Do you often feel tired or fatigued after your sleep? 1 filed at 03/06/2025 1536 1 filed at 02/24/2025 1604   Has anyone ever observed you stop breathing in your sleep? 1 filed at 03/06/2025 1536 1 filed at 02/24/2025 1604   Do you have or are you being treated for high blood pressure? 1 filed at 03/06/2025 1536 1 filed at 02/24/2025 1604   Recent BMI (Calculated) 28.7 filed at 03/06/2025 1536 28.7 filed at 02/24/2025 1604   Is BMI greater than 35 kg/m2? 0=No filed at 03/06/2025 1536 0=No filed at 02/24/2025 1604   Age older than 50 years old? 1=Yes filed at 03/06/2025 1536 1=Yes filed at 02/24/2025 1604   Is your neck circumference greater than 17 inches (Male) or 16 inches (Female)? 0 filed at 03/06/2025 1536 0 filed at 02/24/2025 1604   Gender - Male 1=Yes filed at 03/06/2025 1536 1=Yes filed at 02/24/2025 1604   STOP-BANG Total Score 5 filed at 03/06/2025 1536 5 filed at 02/24/2025 1604          Prodigy: High Risk  Total Score: 8              Prodigy Gender Score          ARISCAT Score for Postoperative Pulmonary Complications      Flowsheet Row Pre-Admission Testing from 2/24/2025 in [facility]   Age Calculated Score 3 filed at 02/24/2025 1718   Preoperative SpO2 0 filed at 02/24/2025 1718   Respiratory infection in the last month Either upper or lower (i.e., URI, bronchitis, pneumonia), with fever and antibiotic treatment 17 filed at 02/24/2025 1718   Preoperative anemia (Hgb less than 10 g/dl) 0 filed at 02/24/2025 1718   Surgical incision  0 filed at 02/24/2025 1718   Duration of surgery  0 filed at 02/24/2025 1718   Emergency Procedure  0 filed at 02/24/2025 1718   ARISCAT Total Score  20 filed at 02/24/2025 1718          Lillie Perioperative Risk for Myocardial Infarction or Cardiac Arrest (MAYRA)      Flowsheet Row Pre-Admission Testing from 2/24/2025 in [facility]   Calculated Age  Score 1.14 filed at 02/24/2025 1725   Functional Status  0 filed at 02/24/2025 1725   ASA Class  -5.17 filed at 02/24/2025 1725   Creatinine 0 filed at 02/24/2025 1725   Type of Procedure  -0.26 filed at 02/24/2025 1725   MAYRA Total Score  -9.54 filed at 02/24/2025 1725   MAYRA % 0.01 filed at 02/24/2025 1725            Nurse Plan of Action:

## 2025-03-06 NOTE — PERIOPERATIVE NURSING NOTE
PAT nurse screening call completed; chart updated per information provided by patient. Education/ instructions reviewed; patient denied further questions or concerns. Patient aware of upcoming PAT and OR dates. Pt states he still has instructions from recent PAT visit and [facility] from recent PAT visit as well.

## 2025-03-27 ENCOUNTER — APPOINTMENT (OUTPATIENT)
Dept: UROLOGY | Facility: CLINIC | Age: 60
End: 2025-03-27
Payer: COMMERCIAL

## 2025-04-01 ENCOUNTER — ANESTHESIA EVENT (OUTPATIENT)
Dept: OPERATING ROOM | Facility: HOSPITAL | Age: 60
End: 2025-04-01
Payer: COMMERCIAL

## 2025-04-01 ENCOUNTER — HOSPITAL ENCOUNTER (OUTPATIENT)
Facility: HOSPITAL | Age: 60
Setting detail: OUTPATIENT SURGERY
Discharge: HOME | End: 2025-04-01
Attending: STUDENT IN AN ORGANIZED HEALTH CARE EDUCATION/TRAINING PROGRAM | Admitting: STUDENT IN AN ORGANIZED HEALTH CARE EDUCATION/TRAINING PROGRAM
Payer: COMMERCIAL

## 2025-04-01 ENCOUNTER — ANESTHESIA (OUTPATIENT)
Dept: OPERATING ROOM | Facility: HOSPITAL | Age: 60
End: 2025-04-01
Payer: COMMERCIAL

## 2025-04-01 VITALS
SYSTOLIC BLOOD PRESSURE: 121 MMHG | OXYGEN SATURATION: 98 % | DIASTOLIC BLOOD PRESSURE: 76 MMHG | RESPIRATION RATE: 13 BRPM | TEMPERATURE: 97 F | BODY MASS INDEX: 29.49 KG/M2 | HEART RATE: 57 BPM | HEIGHT: 65 IN | WEIGHT: 177.03 LBS

## 2025-04-01 DIAGNOSIS — R97.20 ELEVATED PSA: Primary | ICD-10-CM

## 2025-04-01 LAB — GLUCOSE BLD MANUAL STRIP-MCNC: 154 MG/DL (ref 74–99)

## 2025-04-01 PROCEDURE — 7100000010 HC PHASE TWO TIME - EACH INCREMENTAL 1 MINUTE: Performed by: STUDENT IN AN ORGANIZED HEALTH CARE EDUCATION/TRAINING PROGRAM

## 2025-04-01 PROCEDURE — 88305 TISSUE EXAM BY PATHOLOGIST: CPT | Performed by: PATHOLOGY

## 2025-04-01 PROCEDURE — 3600000009 HC OR TIME - EACH INCREMENTAL 1 MINUTE - PROCEDURE LEVEL FOUR: Performed by: STUDENT IN AN ORGANIZED HEALTH CARE EDUCATION/TRAINING PROGRAM

## 2025-04-01 PROCEDURE — C1819 TISSUE LOCALIZATION-EXCISION: HCPCS | Performed by: STUDENT IN AN ORGANIZED HEALTH CARE EDUCATION/TRAINING PROGRAM

## 2025-04-01 PROCEDURE — 2500000004 HC RX 250 GENERAL PHARMACY W/ HCPCS (ALT 636 FOR OP/ED): Performed by: NURSE ANESTHETIST, CERTIFIED REGISTERED

## 2025-04-01 PROCEDURE — 76872 US TRANSRECTAL: CPT | Performed by: STUDENT IN AN ORGANIZED HEALTH CARE EDUCATION/TRAINING PROGRAM

## 2025-04-01 PROCEDURE — 3700000001 HC GENERAL ANESTHESIA TIME - INITIAL BASE CHARGE: Performed by: STUDENT IN AN ORGANIZED HEALTH CARE EDUCATION/TRAINING PROGRAM

## 2025-04-01 PROCEDURE — 55700 PR PROSTATE NEEDLE BIOPSY ANY APPROACH: CPT | Performed by: STUDENT IN AN ORGANIZED HEALTH CARE EDUCATION/TRAINING PROGRAM

## 2025-04-01 PROCEDURE — 7100000009 HC PHASE TWO TIME - INITIAL BASE CHARGE: Performed by: STUDENT IN AN ORGANIZED HEALTH CARE EDUCATION/TRAINING PROGRAM

## 2025-04-01 PROCEDURE — A55706 PR BIOPSY OF PROSTATE,NEEDLE,TRANSPERINEAL: Performed by: NURSE ANESTHETIST, CERTIFIED REGISTERED

## 2025-04-01 PROCEDURE — 82947 ASSAY GLUCOSE BLOOD QUANT: CPT

## 2025-04-01 PROCEDURE — 88344 IMHCHEM/IMCYTCHM EA MLT ANTB: CPT | Performed by: PATHOLOGY

## 2025-04-01 PROCEDURE — 3700000002 HC GENERAL ANESTHESIA TIME - EACH INCREMENTAL 1 MINUTE: Performed by: STUDENT IN AN ORGANIZED HEALTH CARE EDUCATION/TRAINING PROGRAM

## 2025-04-01 PROCEDURE — 7100000002 HC RECOVERY ROOM TIME - EACH INCREMENTAL 1 MINUTE: Performed by: STUDENT IN AN ORGANIZED HEALTH CARE EDUCATION/TRAINING PROGRAM

## 2025-04-01 PROCEDURE — 88305 TISSUE EXAM BY PATHOLOGIST: CPT | Mod: TC,SUR,BEALAB | Performed by: STUDENT IN AN ORGANIZED HEALTH CARE EDUCATION/TRAINING PROGRAM

## 2025-04-01 PROCEDURE — 2720000007 HC OR 272 NO HCPCS: Performed by: STUDENT IN AN ORGANIZED HEALTH CARE EDUCATION/TRAINING PROGRAM

## 2025-04-01 PROCEDURE — 7100000001 HC RECOVERY ROOM TIME - INITIAL BASE CHARGE: Performed by: STUDENT IN AN ORGANIZED HEALTH CARE EDUCATION/TRAINING PROGRAM

## 2025-04-01 PROCEDURE — A55706 PR BIOPSY OF PROSTATE,NEEDLE,TRANSPERINEAL: Performed by: ANESTHESIOLOGY

## 2025-04-01 PROCEDURE — 2500000004 HC RX 250 GENERAL PHARMACY W/ HCPCS (ALT 636 FOR OP/ED): Performed by: STUDENT IN AN ORGANIZED HEALTH CARE EDUCATION/TRAINING PROGRAM

## 2025-04-01 PROCEDURE — 3600000004 HC OR TIME - INITIAL BASE CHARGE - PROCEDURE LEVEL FOUR: Performed by: STUDENT IN AN ORGANIZED HEALTH CARE EDUCATION/TRAINING PROGRAM

## 2025-04-01 RX ORDER — FENTANYL CITRATE 50 UG/ML
INJECTION, SOLUTION INTRAMUSCULAR; INTRAVENOUS AS NEEDED
Status: DISCONTINUED | OUTPATIENT
Start: 2025-04-01 | End: 2025-04-01

## 2025-04-01 RX ORDER — FENTANYL CITRATE 50 UG/ML
50 INJECTION, SOLUTION INTRAMUSCULAR; INTRAVENOUS EVERY 5 MIN PRN
Status: DISCONTINUED | OUTPATIENT
Start: 2025-04-01 | End: 2025-04-01 | Stop reason: HOSPADM

## 2025-04-01 RX ORDER — ONDANSETRON HYDROCHLORIDE 2 MG/ML
INJECTION, SOLUTION INTRAVENOUS AS NEEDED
Status: DISCONTINUED | OUTPATIENT
Start: 2025-04-01 | End: 2025-04-01

## 2025-04-01 RX ORDER — CEFTRIAXONE 2 G/50ML
2 INJECTION, SOLUTION INTRAVENOUS ONCE
Status: COMPLETED | OUTPATIENT
Start: 2025-04-01 | End: 2025-04-01

## 2025-04-01 RX ORDER — ALBUTEROL SULFATE 0.83 MG/ML
2.5 SOLUTION RESPIRATORY (INHALATION) ONCE AS NEEDED
Status: DISCONTINUED | OUTPATIENT
Start: 2025-04-01 | End: 2025-04-01 | Stop reason: HOSPADM

## 2025-04-01 RX ORDER — BUPIVACAINE HYDROCHLORIDE 2.5 MG/ML
INJECTION, SOLUTION INFILTRATION; PERINEURAL AS NEEDED
Status: DISCONTINUED | OUTPATIENT
Start: 2025-04-01 | End: 2025-04-01 | Stop reason: HOSPADM

## 2025-04-01 RX ORDER — MEPERIDINE HYDROCHLORIDE 25 MG/ML
12.5 INJECTION INTRAMUSCULAR; INTRAVENOUS; SUBCUTANEOUS EVERY 10 MIN PRN
Status: DISCONTINUED | OUTPATIENT
Start: 2025-04-01 | End: 2025-04-01 | Stop reason: HOSPADM

## 2025-04-01 RX ORDER — LIDOCAINE HYDROCHLORIDE 10 MG/ML
0.1 INJECTION, SOLUTION EPIDURAL; INFILTRATION; INTRACAUDAL; PERINEURAL ONCE
Status: DISCONTINUED | OUTPATIENT
Start: 2025-04-01 | End: 2025-04-01 | Stop reason: HOSPADM

## 2025-04-01 RX ORDER — PROPOFOL 10 MG/ML
INJECTION, EMULSION INTRAVENOUS AS NEEDED
Status: DISCONTINUED | OUTPATIENT
Start: 2025-04-01 | End: 2025-04-01

## 2025-04-01 RX ORDER — HYDRALAZINE HYDROCHLORIDE 20 MG/ML
5 INJECTION INTRAMUSCULAR; INTRAVENOUS EVERY 30 MIN PRN
Status: DISCONTINUED | OUTPATIENT
Start: 2025-04-01 | End: 2025-04-01 | Stop reason: HOSPADM

## 2025-04-01 RX ORDER — SODIUM CHLORIDE, SODIUM LACTATE, POTASSIUM CHLORIDE, CALCIUM CHLORIDE 600; 310; 30; 20 MG/100ML; MG/100ML; MG/100ML; MG/100ML
100 INJECTION, SOLUTION INTRAVENOUS CONTINUOUS
Status: DISCONTINUED | OUTPATIENT
Start: 2025-04-01 | End: 2025-04-01 | Stop reason: HOSPADM

## 2025-04-01 RX ORDER — ONDANSETRON HYDROCHLORIDE 2 MG/ML
4 INJECTION, SOLUTION INTRAVENOUS ONCE AS NEEDED
Status: DISCONTINUED | OUTPATIENT
Start: 2025-04-01 | End: 2025-04-01 | Stop reason: HOSPADM

## 2025-04-01 RX ORDER — MIDAZOLAM HYDROCHLORIDE 1 MG/ML
1 INJECTION, SOLUTION INTRAMUSCULAR; INTRAVENOUS ONCE AS NEEDED
Status: DISCONTINUED | OUTPATIENT
Start: 2025-04-01 | End: 2025-04-01 | Stop reason: HOSPADM

## 2025-04-01 RX ORDER — SODIUM CHLORIDE, SODIUM LACTATE, POTASSIUM CHLORIDE, CALCIUM CHLORIDE 600; 310; 30; 20 MG/100ML; MG/100ML; MG/100ML; MG/100ML
INJECTION, SOLUTION INTRAVENOUS CONTINUOUS PRN
Status: DISCONTINUED | OUTPATIENT
Start: 2025-04-01 | End: 2025-04-01

## 2025-04-01 RX ORDER — LIDOCAINE HYDROCHLORIDE 10 MG/ML
INJECTION, SOLUTION EPIDURAL; INFILTRATION; INTRACAUDAL; PERINEURAL AS NEEDED
Status: DISCONTINUED | OUTPATIENT
Start: 2025-04-01 | End: 2025-04-01

## 2025-04-01 RX ADMIN — FENTANYL CITRATE 50 MCG: 0.05 INJECTION, SOLUTION INTRAMUSCULAR; INTRAVENOUS at 09:09

## 2025-04-01 RX ADMIN — PROPOFOL 30 MG: 10 INJECTION, EMULSION INTRAVENOUS at 09:20

## 2025-04-01 RX ADMIN — PROPOFOL 100 MG: 10 INJECTION, EMULSION INTRAVENOUS at 09:12

## 2025-04-01 RX ADMIN — SODIUM CHLORIDE, POTASSIUM CHLORIDE, SODIUM LACTATE AND CALCIUM CHLORIDE: 600; 310; 30; 20 INJECTION, SOLUTION INTRAVENOUS at 09:09

## 2025-04-01 RX ADMIN — CEFTRIAXONE SODIUM 2 G: 2 INJECTION, SOLUTION INTRAVENOUS at 09:09

## 2025-04-01 RX ADMIN — ONDANSETRON 4 MG: 2 INJECTION, SOLUTION INTRAMUSCULAR; INTRAVENOUS at 09:18

## 2025-04-01 RX ADMIN — PROPOFOL 20 MG: 10 INJECTION, EMULSION INTRAVENOUS at 09:14

## 2025-04-01 RX ADMIN — LIDOCAINE HYDROCHLORIDE 5 ML: 10 INJECTION, SOLUTION EPIDURAL; INFILTRATION; INTRACAUDAL; PERINEURAL at 09:12

## 2025-04-01 SDOH — HEALTH STABILITY: MENTAL HEALTH: CURRENT SMOKER: 0

## 2025-04-01 ASSESSMENT — PAIN SCALES - GENERAL
PAINLEVEL_OUTOF10: 0 - NO PAIN
PAIN_LEVEL: 2
PAINLEVEL_OUTOF10: 0 - NO PAIN

## 2025-04-01 ASSESSMENT — COLUMBIA-SUICIDE SEVERITY RATING SCALE - C-SSRS
1. IN THE PAST MONTH, HAVE YOU WISHED YOU WERE DEAD OR WISHED YOU COULD GO TO SLEEP AND NOT WAKE UP?: NO
6. HAVE YOU EVER DONE ANYTHING, STARTED TO DO ANYTHING, OR PREPARED TO DO ANYTHING TO END YOUR LIFE?: NO
2. HAVE YOU ACTUALLY HAD ANY THOUGHTS OF KILLING YOURSELF?: NO

## 2025-04-01 ASSESSMENT — PAIN - FUNCTIONAL ASSESSMENT
PAIN_FUNCTIONAL_ASSESSMENT: 0-10

## 2025-04-01 NOTE — OP NOTE
Biopsy Prostate with Navigation (  ( Propofol, no ETT )US, URONAV, FORTEC ), Ultrasonography Transrectal Prostate Operative Note     Date: 2025  OR Location: [facility]    Name: Yonis Murrell, : 1965, Age: 59 y.o., MRN: [id number], Sex: male    Diagnosis  Pre-op Diagnosis      * Elevated PSA [R97.20] Post-op Diagnosis     * Elevated PSA [R97.20]     Procedures  Biopsy Prostate with Navigation (  ( Propofol, no ETT )US, URONAV, FORTEC )  13673 - CA PROSTATE NEEDLE BIOPSY ANY APPROACH    Ultrasonography Transrectal Prostate  75972 - CHG US TRANSRECTAL      Surgeons      * Lucio Benson - Primary    Resident/Fellow/Other Assistant:  Surgeons and Role:  * No surgeons found with a matching role *    Staff:   Esthelaulator: Myla Allan Person: Jose    Anesthesia Staff: Anesthesiologist: Noe Miller MD  CRNA: GLENYS Urias-CRNA        Estimated Blood Loss (ml)  5.   Specimen(s) Removed  Removed region of interest and systematic biopsies.   Drain(s)  None.   Implant(s)  None.   Complications  None.   Indications (History)  Elevated PSA.   Findings of Procedure  70g prostate, no sonographic lesions.   Description of Procedure  After administration of anesthesia, a prostate block was performed and transrectal ultrasound. Transperineal biopsies taken from region of interest and systematic template performed using the precision point device.

## 2025-04-01 NOTE — ANESTHESIA POSTPROCEDURE EVALUATION
Patient: Yonis Murrell    Procedure Summary       Date: 04/01/25 Room / Location: [facility] / [facility]    Anesthesia Start: 0909 Anesthesia Stop: 0935    Procedures:       Biopsy Prostate with Navigation (  ( Propofol, no ETT )US, URONAV, FORTEC )      Ultrasonography Transrectal Prostate Diagnosis:       Elevated PSA      (Elevated PSA [R97.20])    Surgeons: Lucio Benson MD Responsible Provider: Noe Miller MD    Anesthesia Type: MAC ASA Status: 2            Anesthesia Type: MAC    Vitals Value Taken Time   /75 04/01/25 0950   Temp 36.4 °C (97.5 °F) 04/01/25 0940   Pulse 58 04/01/25 0950   Resp 8 04/01/25 0950   SpO2 98 % 04/01/25 0950   Vitals shown include unfiled device data.    Anesthesia Post Evaluation    Patient location during evaluation: PACU  Patient participation: complete - patient participated  Level of consciousness: sleepy but conscious  Pain score: 2  Pain management: adequate  Multimodal analgesia pain management approach  Airway patency: patent  Cardiovascular status: acceptable  Respiratory status: acceptable  Hydration status: acceptable  Postoperative Nausea and Vomiting: none        No notable events documented.

## 2025-04-01 NOTE — PERIOPERATIVE NURSING NOTE
"0930 Patient arrives to pacu 2. Denies pain and nausea.  Sleepy. \" Please do not call my wife until I am coherent.\" Abd soft no active bleeding vitals stable sinus rhythm . Lungs clear ,moving all four extremities. Warm blanket provided.  0945 Continues to sleep unless stimulated states he prefers to rest instead of having po fluids for now.  1001 TO SDS.  "

## 2025-04-01 NOTE — ANESTHESIA PREPROCEDURE EVALUATION
Patient: Yonis Murrell    Procedure Information       Date/Time: 04/01/25 0855    Procedures:       Biopsy Prostate with Navigation (  ( Propofol, no ETT )US, URONAV, FORTEC ) - Propofol, no ETT      Ultrasonography Transrectal Prostate    Location: [facility] / [facility]    Surgeons: Lucio Benson MD            Relevant Problems   Endocrine   (+) Type 2 diabetes mellitus without complication, without long-term current use of insulin       Clinical information reviewed:                   NPO Detail:  No data recorded     Physical Exam    Airway  Mallampati: II  TM distance: >3 FB  Neck ROM: full     Cardiovascular - normal exam     Dental - normal exam     Pulmonary - normal exam     Abdominal - normal exam             Anesthesia Plan    History of general anesthesia?: yes  History of complications of general anesthesia?: no    ASA 2     MAC     The patient is not a current smoker.  Patient was not previously instructed to abstain from smoking on day of procedure.  Patient did not smoke on day of procedure.  Education provided regarding risk of obstructive sleep apnea.  intravenous induction   Postoperative administration of opioids is intended.  Trial extubation is planned.  Anesthetic plan and risks discussed with patient.  Use of blood products discussed with patient who consented to blood products.    Plan discussed with CAA, attending and CRNA.

## 2025-04-01 NOTE — H&P
"History Of Present Illness  Yonis Murrell is a 59 y.o. male presenting with elevated psa.     Past Medical History  Past Medical History:   Diagnosis Date    ADHD (attention deficit hyperactivity disorder)     Allergic rhinitis due to pollen     Hay fever    Anxiety     Bilateral plantar fasciitis 02/27/2024    BPH (benign prostatic hyperplasia)     Depression     Lactose intolerance, unspecified     Lactose intolerance    Pain in leg, unspecified 04/08/2015    Leg pain    Pain in unspecified ankle and joints of unspecified foot 04/08/2015    Ankle pain    Personal history of other diseases of the circulatory system 04/11/2016    History of essential hypertension    Personal history of other diseases of the respiratory system 04/08/2015    History of acute bronchitis    Sleep apnea     Type 2 diabetes mellitus        Surgical History  Past Surgical History:   Procedure Laterality Date    ADENOIDECTOMY  05/12/2015    Adenoidectomy    CHOLECYSTECTOMY  05/12/2015    Cholecystectomy    COLONOSCOPY      ESOPHAGOGASTRODUODENOSCOPY  05/12/2015    Diagnostic Esophagogastroduodenoscopy    OTHER SURGICAL HISTORY  05/12/2015    Biopsy Tongue        Social History  He reports that he has never smoked. He has never been exposed to tobacco smoke. He has never used smokeless tobacco. He reports that he does not drink alcohol and does not use drugs.    Family History  Family History   Problem Relation Name Age of Onset    Hyperlipidemia Mother      Diabetes Father      Tremor Father      Valvular heart disease Father      Atrial fibrillation Father      Arrhythmia Sister          Allergies  Patient has no known allergies.    Review of Systems     Physical Exam     Last Recorded Vitals  Blood pressure 115/80, pulse 62, temperature 36.5 °C (97.7 °F), temperature source Temporal, resp. rate 16, height 1.651 m (5' 5\"), weight 80.3 kg (177 lb 0.5 oz), SpO2 97%.    Relevant Results             Assessment/Plan   Assessment & " Plan  Elevated PSA      biopsy       I spent  minutes in the professional and overall care of this patient.      Lucio Benson MD

## 2025-04-01 NOTE — DISCHARGE INSTRUCTIONS
Dr. Benson - [facility]  Phone: [phone]    Follow-Up Information    Following your Prostate Biopsy, please follow up with Dr. Benson as scheduled    Medication  Please take the medications as prescribed by your doctor. Tylenol or non-steroids! anti-inflammatory medications (such as Aleve®) should relieve mild pain and discomfort. Resume the usual medications you took before surgery unless instructed otherwise.    Resuming Activities and Driving  *NO Driving on the day of surgery  *You may resume driving the day after surgery  *You may resume normal activities as tolerated  *You may shower     Diet  You may resume your normal diet once at home, with no additional considerations.    Expected Signs and Symptoms  You may have a small amount of bleeding with urination on occasion. This may be accompanied with small blood clots. This is normal and should be relieved by increasing your fluid intake.  You may experience some mild burning and discomfort during urination. This is normal and should subside in one to two weeks.      When to Call Your Doctor - Dr. Benson [phone]  Please call the office immediately if any of the following symptoms appear:    *Inability to eat, drink, or take medication  *Persistent Nausea or Vomiting  *Fever over 100.4 degrees F. (38 degrees C.)    Please call 9-1-1 if you experience any of the following:  *Chest Pain, Shortness of Breath or Difficulty Breathing  *Sudden one sided weakness/slurred speech/ any signs or symptoms of a stroke  *Severe headache or visual disturbance    Additional Home-Going Instructions for Adults Who Have Had Anesthesia or Sedatives:    The anesthetics, sedatives and pain killers which were given to you will be acting in your body for the next 24 hours.  This may cause you to feel sleepy.  This feeling will slowly wear off.    For the next 24 hours you SHOULD NOT:  *Drive a car, or operate machinery or power tools  *Drink any form of alcohol  Mild agitation - Sx treatment per palliative care (including beer or wine)  *Make any important Decisions

## 2025-04-14 LAB
LAB AP ASR DISCLAIMER: NORMAL
LABORATORY COMMENT REPORT: NORMAL
PATH REPORT.FINAL DX SPEC: NORMAL
PATH REPORT.GROSS SPEC: NORMAL
PATH REPORT.RELEVANT HX SPEC: NORMAL
PATH REPORT.TOTAL CANCER: NORMAL

## 2025-04-24 ENCOUNTER — APPOINTMENT (OUTPATIENT)
Dept: UROLOGY | Facility: CLINIC | Age: 60
End: 2025-04-24
Payer: COMMERCIAL

## 2025-04-24 VITALS — TEMPERATURE: 97.1 F

## 2025-04-24 DIAGNOSIS — R97.20 ELEVATED PSA: Primary | ICD-10-CM

## 2025-04-24 PROCEDURE — 3044F HG A1C LEVEL LT 7.0%: CPT | Performed by: STUDENT IN AN ORGANIZED HEALTH CARE EDUCATION/TRAINING PROGRAM

## 2025-04-24 PROCEDURE — 99213 OFFICE O/P EST LOW 20 MIN: CPT | Performed by: STUDENT IN AN ORGANIZED HEALTH CARE EDUCATION/TRAINING PROGRAM

## 2025-04-24 ASSESSMENT — PAIN SCALES - GENERAL: PAINLEVEL_OUTOF10: 0-NO PAIN

## 2025-04-24 NOTE — PROGRESS NOTES
HPI:  Proc (4/1/25): TP prostate biopsy   Path: benign prostate tissue     Yonis Murrell is a 59 y.o. male referred by Dr. Fernandez for elevated PSA. Hx of BPH w/ LUTS, hypogonadism, MALA, DM2. MRI Prostate (1/22/25) showed 77.8 ml, a 0.9 cm ovoid T2 hypointense focal lesion is noted in the left lateral PZ within the prostatic mid gland, showing focally restricted diffusion, consistent with a PI-RADS 4 lesion, findings of BPH (PI-RADS 2). S/p TP prostate biopsy (4/1/25) with pathology showing benign prostate tissue. Doing well.      PSA: 3.79 (1/22/25)    MRI Prostate (1/22/25): 77.8 ml, a 0.9 cm ovoid T2 hypointense focal lesion is noted in the left lateral PZ within the prostatic mid gland, showing focally restricted diffusion, consistent with a PI-RADS 4 lesion, findings of BPH (PI-RADS 2).    Review of Systems:  All systems reviewed. Anything negative noted in the HPI.    Physical Exam:  Vitals signs reviewed.  Constitutional:      Appearance: Well-developed.  HENT:     Head: Normocephalic and atraumatic.  Neck:     Musculoskeletal: Normal range of motion.  Pulmonary:     Effort: Pulmonary effort is normal.  Musculoskeletal: Normal range of motion.  Skin:     General: Skin is warm and dry.  Neurological:     Mental Status: Alert and oriented to person, place, and time.  Psychiatric:        Behavior: Behavior normal.        Thought Content: Thought content normal.        Judgment: Judgment normal.    Procedures:    Assessment/Plan   Yonis Murrell is a 59 y.o. male referred by Dr. Fernandez for elevated PSA. Hx of BPH w/ LUTS, hypogonadism, MALA, DM2. MRI Prostate (1/22/25) showed 77.8 ml, a 0.9 cm ovoid T2 hypointense focal lesion is noted in the left lateral PZ within the prostatic mid gland, showing focally restricted diffusion, consistent with a PI-RADS 4 lesion, findings of BPH (PI-RADS 2). S/p TP prostate biopsy (4/1/25) with pathology showing benign prostate tissue. Doing well. Management options  including risks, benefits and alternatives discussed at length and all questions answered. Patient prefers to proceed with follow-up with Dr. Fernandez and will monitor PSA annually.           Scribe Attestation:  By signing my name below, Estrella BAZAN Scribe   attest that this documentation has been prepared under the direction and in the presence of Lucio Benson MD.

## 2025-04-30 ENCOUNTER — APPOINTMENT (OUTPATIENT)
Dept: PRIMARY CARE | Facility: CLINIC | Age: 60
End: 2025-04-30
Payer: COMMERCIAL

## 2025-05-01 DIAGNOSIS — E11.9 TYPE 2 DIABETES MELLITUS WITHOUT COMPLICATION, WITHOUT LONG-TERM CURRENT USE OF INSULIN: ICD-10-CM

## 2025-05-01 RX ORDER — METFORMIN HYDROCHLORIDE 500 MG/1
TABLET, EXTENDED RELEASE ORAL
Qty: 180 TABLET | Refills: 1 | Status: SHIPPED | OUTPATIENT
Start: 2025-05-01

## 2025-05-05 ENCOUNTER — TELEMEDICINE CLINICAL SUPPORT (OUTPATIENT)
Dept: NUTRITION | Facility: HOSPITAL | Age: 60
End: 2025-05-05
Payer: COMMERCIAL

## 2025-05-05 DIAGNOSIS — E11.9 TYPE 2 DIABETES MELLITUS WITHOUT COMPLICATION, WITHOUT LONG-TERM CURRENT USE OF INSULIN: Primary | ICD-10-CM

## 2025-05-05 PROCEDURE — 97803 MED NUTRITION INDIV SUBSEQ: CPT | Mod: 95

## 2025-05-05 PROCEDURE — 97803 MED NUTRITION INDIV SUBSEQ: CPT

## 2025-05-05 NOTE — PROGRESS NOTES
Nutrition: Follow-up     Reason for Nutrition Visit: Patient is a 58 y.o. male referred for T2DM. Referred on 11/26/24 by Dr. Rocky Orozco.     Virtual or Telephone Consent  While technically available, the patient was unable or unwilling to consent to connect via audio/video telehealth technology; therefore, I performed this visit using a real-time audio only connection between Yonis Handy & Breana Hernandez RD, LD. Verbal consent was requested and obtained from Yonis Handy on this date, 05/05/25 for a telehealth visit and the patient's location was confirmed at the time of the visit. **Pt had technology issues.       Nutrition Assessment    Food and Nutrient History: He used the meter to verify his CGM and found CGM readings fairly accurate. He is trying to walk more before/after meals. Understands what he needs to do to modify his diet.     Fluid Intake: herbal tea, water, seltzer  Alcohol: 0-1 drink per week  Dietary Considerations: keeps Kosher  Appetite: Normal  Food Allergy: none  Food Intolerance: some degree of lactose intolerance  GI Symptoms: none    Dentition: own  Cooking: Patient, Spouse/Significant Other  Grocery Shopping: Patient, Spouse/Significant Other  Dietary Supplements: none  Food Insecurity: Denies    Physical Activity: Inconsistent     Labs:  Lipid Panel trend:    Recent Labs     10/21/24  1016 11/03/23  0802 07/28/22  1451 07/22/21  1442 07/16/20  1528 07/08/19  0954   CHOL 119 127 117   < > 102 120   HDL 31.9 28.1 29.6*   < > 28.2* 30.7*   LDLCALC 57 57  --   --   --   --    LDLF  --   --  25  --  34 44   VLDL 30 42* 62*  --  40 45*   TRIG 150* 208* 312*  --  201* 226*    < > = values in this interval not displayed.       A1c  6.0% 1/22/25  6.3% 9/30/24  6.4% 5/29/24  7.1% 02/27/24  8.5% 11/03/23  6.0% 07/28/22    Diabetes:  Diagnosed end of 2023  Prior Nutrition Education: Yes  SMBG: Freestyle Madeleine 3, data reviewed   Hypoglycemia: none  Current DM Medications/Insulin Regimen:  -  "Metformin  mg TID    Madeleine 3+  4/22/25-5/5/25  Average Glucose: 162 mg/dL  GMI: 7.2%  Glucose Variability: 20.2%    2% of time was spent > 250 mg/dL  22 of time was spent between 181-250 mg/dL  76% of time was spent between  mg/dL  0% of time was spent between 54-69 mg/dL   0% of time was spent < 54 mg/dL      Nutrition Focused Physical Exam:  Performed/Deferred: Deferred as pt visually appears well-nourished with no signs of malnutrition      Past Medical History:  Patient Active Problem List   Diagnosis    BPH associated with nocturia    Adjustment disorder with anxiety    Obstructive sleep apnea    Bronchitis    Acute cough        Anthropometrics:  Ht Readings from Last 1 Encounters:   02/12/24 1.651 m (5' 5\")     BMI Readings from Last 1 Encounters:   04/01/25 29.46 kg/m²     Wt Readings from Last 10 Encounters:   04/01/25 80.3 kg (177 lb 0.5 oz)   02/24/25 78.3 kg (172 lb 9.9 oz)   01/29/25 80.7 kg (178 lb)   10/30/24 80.4 kg (177 lb 3.2 oz)   09/11/24 78 kg (172 lb)   05/29/24 76.7 kg (169 lb)   02/27/24 78.9 kg (174 lb)   01/26/24 78.9 kg (174 lb)   12/22/23 80.7 kg (178 lb)   10/25/23 82.6 kg (182 lb)     Wt change: Wt stable since last visit     Estimated Energy Needs:    Total Energy Estimated Needs (kCal): 1800 kCal   Method for Estimating Needs: 1546 x 1.3 - 250 (for weight loss)   Total Protein Estimated Needs (g): 80 g   Total Protein Estimated Needs (g/kg): 1 g/kg    Nutrition Diagnosis     Patient has Malnutrition Diagnosis: No        Patient has Nutrition Diagnosis: Yes Diagnosis Status (1): ongoing  Nutrition Diagnosis 1: Altered nutrition related to laboratory values Related to (1): DM As Evidenced by (1): A1c = 8.5% (11/3/23)   Update: A1c = 6.0% (1/22/25)       Nutrition Interventions/Recommendations   Meals & Snacks: Carbohydrate-modified diet, Energy-modified diet, Fiber-modified diet, Modify Composition of Meals/Snacks, General Healthful Diet, Protein-modified diet    Nutrition " Education:  Discussed continued adherence to dietary modifications.   Encouraged him to talk with his PCP about maximizing Metformin.     Nutrition Monitoring and Evaluation   Reduced TG < 150  - improved, ongoing   A1c: less than 7% - met, ongoing     Readiness to Change : Excellent  Level of Understanding : Excellent  Anticipated Compliant : Excellent     Follow-up: 3 months; he will keep me posted via email regarding any interim questions

## 2025-05-07 ENCOUNTER — APPOINTMENT (OUTPATIENT)
Dept: PRIMARY CARE | Facility: CLINIC | Age: 60
End: 2025-05-07
Payer: COMMERCIAL

## 2025-05-07 VITALS
BODY MASS INDEX: 28.96 KG/M2 | TEMPERATURE: 96.9 F | HEART RATE: 87 BPM | DIASTOLIC BLOOD PRESSURE: 71 MMHG | WEIGHT: 174 LBS | SYSTOLIC BLOOD PRESSURE: 111 MMHG

## 2025-05-07 DIAGNOSIS — E11.65 TYPE 2 DIABETES MELLITUS WITH HYPERGLYCEMIA, WITHOUT LONG-TERM CURRENT USE OF INSULIN: Primary | ICD-10-CM

## 2025-05-07 PROCEDURE — 1036F TOBACCO NON-USER: CPT | Performed by: INTERNAL MEDICINE

## 2025-05-07 PROCEDURE — 99213 OFFICE O/P EST LOW 20 MIN: CPT | Performed by: INTERNAL MEDICINE

## 2025-05-07 PROCEDURE — 3074F SYST BP LT 130 MM HG: CPT | Performed by: INTERNAL MEDICINE

## 2025-05-07 PROCEDURE — 3078F DIAST BP <80 MM HG: CPT | Performed by: INTERNAL MEDICINE

## 2025-05-07 PROCEDURE — 3044F HG A1C LEVEL LT 7.0%: CPT | Performed by: INTERNAL MEDICINE

## 2025-05-07 ASSESSMENT — PAIN SCALES - GENERAL: PAINLEVEL_OUTOF10: 0-NO PAIN

## 2025-05-07 NOTE — PROGRESS NOTES
Subjective   Patient ID: Yonis Murrell is a 59 y.o. male who presents for Follow-up.    Past medical history includes diabetes, anxiety/adjustment disorder and MALA.    Here for 3 mo FUV for diabetes    Has been having discrepancy between CGM data and A1c. A1c historically have been ~2 lower than CGM GMI. Started SMBG occasionally fasting -160, says within 10% of what CGM would read  150 on CGM, 172 on BG  142 on CGM, 147 on BG    Has found CGM testing helpful. Has worked with dietician and watching carbs, eliminating rice. Wants to start exercising more.        --------copied for reference---------  Interim:  - urology, Dr. CLINTON; for treatment of hypogonadism, seen 1/29/2025, also referred for prostate biopsy due to elevated PSA and findings on prostate MRI     His CGM seems to run higher than his hemoglobin A1c suggests but reviewed and information overall is useful.  We increased the metformin at last visit and he has had no side effects and blood sugars remain well-controlled.     Understandably concerned about possible prostate cancer diagnosis, discussed.  Has plans to follow-up with his psychologist for further discussion.     **COPIED FORWARD FOR REFERENCE**      , 3 kids doing well.   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  Oldest daughter in [place], second daughter  and living in [facility], son going to [facility].  No EtOH, tobacco.     Providers:  GI-Dr. Frankel  Psychiatry-Dr. Jeremias Rodriguez, pyschologist is Dr. Katja Bates  Dermatology-Dr. Vieyra, not recently  Sleep - Dr. Murtaza Ladd  Urology - Dr. Fernandez    Objective   /71 (BP Location: Left arm, Patient Position: Sitting, BP Cuff Size: Adult)   Pulse 87   Temp 36.1 °C (96.9 °F) (Temporal)   Wt 78.9 kg (174 lb)   BMI 28.96 kg/m²     General: NAD, sitting comfortably  HEENT: NCAT, EOMI  Pulmonary: on RA  Neuro: A&Ox3    Assessment/Plan     NIDDM: discrepancy between A1c and CGM readings. SMBG readings similar to CGM data, concern that  A1c may be falsely lowered  -Reports he saw ophthalmology 12/2024  -Needs monofilament testing  -Continue Metformin 500mg BID for now  -Extensive discussion and with RD previously  -Continue monitoring with Libre3  -Lipids never an issue but probably would start low dose given DM, defer to next visit, consider CT cardiac score to further guide goals of treatment  -Check fructosamine, A1c. Will consider increasing metformin dosing pending fructosamine   -Check fasting c-peptide, CMP     Elevated PSA/PI-RADS on MRI prostate: Referred for prostate biopsy completed 4/1/25 with pathology showing benign prostate tissue, will follow up with Dr. CLINTON and monitor PSA annually     --------copied for reference---------      Low testosterone/BPH:    - saw urology, Dr. CLINTON; 10/28/2024, increased dose of testosterone injections, RTC 2 months  - cont Flomax      MALA/History of hypertension: Off meds, no hypertension since being on CPAP; using regularly, helps with sleep quality and energy, continue     Health maintenance  -Last colonoscopy: 12/3/2020, normal; repeat 5 years (hx of polyps)  - PSA: 3.43ng/mL, 10/21/2024  -Smoking history: Never  -Counseled regarding diet and exercise, patient has relatively good at home, discussed portion control, increase exercise  -Immunizations: current  -Followup in 3 months;  AWV: 10/30/2024

## 2025-05-10 LAB
ALBUMIN SERPL-MCNC: 4.4 G/DL (ref 3.6–5.1)
ALP SERPL-CCNC: 61 U/L (ref 35–144)
ALT SERPL-CCNC: 16 U/L (ref 9–46)
ANION GAP SERPL CALCULATED.4IONS-SCNC: 6 MMOL/L (CALC) (ref 7–17)
AST SERPL-CCNC: 22 U/L (ref 10–35)
BILIRUB SERPL-MCNC: 1 MG/DL (ref 0.2–1.2)
BUN SERPL-MCNC: 14 MG/DL (ref 7–25)
C PEPTIDE SERPL-MCNC: 2.47 NG/ML (ref 0.8–3.85)
CALCIUM SERPL-MCNC: 9.2 MG/DL (ref 8.6–10.3)
CHLORIDE SERPL-SCNC: 104 MMOL/L (ref 98–110)
CO2 SERPL-SCNC: 30 MMOL/L (ref 20–32)
CREAT SERPL-MCNC: 0.77 MG/DL (ref 0.7–1.3)
EGFRCR SERPLBLD CKD-EPI 2021: 104 ML/MIN/1.73M2
EST. AVERAGE GLUCOSE BLD GHB EST-MCNC: 128 MG/DL
EST. AVERAGE GLUCOSE BLD GHB EST-SCNC: 7.1 MMOL/L
FRUCTOSAMINE SERPL-SCNC: NORMAL UMOL/L
GLUCOSE SERPL-MCNC: 147 MG/DL (ref 65–99)
HBA1C MFR BLD: 6.1 %
POTASSIUM SERPL-SCNC: 4.2 MMOL/L (ref 3.5–5.3)
PROT SERPL-MCNC: 6.9 G/DL (ref 6.1–8.1)
SODIUM SERPL-SCNC: 140 MMOL/L (ref 135–146)

## 2025-05-14 LAB
ALBUMIN SERPL-MCNC: 4.4 G/DL (ref 3.6–5.1)
ALP SERPL-CCNC: 61 U/L (ref 35–144)
ALT SERPL-CCNC: 16 U/L (ref 9–46)
ANION GAP SERPL CALCULATED.4IONS-SCNC: 6 MMOL/L (CALC) (ref 7–17)
AST SERPL-CCNC: 22 U/L (ref 10–35)
BILIRUB SERPL-MCNC: 1 MG/DL (ref 0.2–1.2)
BUN SERPL-MCNC: 14 MG/DL (ref 7–25)
C PEPTIDE SERPL-MCNC: 2.47 NG/ML (ref 0.8–3.85)
CALCIUM SERPL-MCNC: 9.2 MG/DL (ref 8.6–10.3)
CHLORIDE SERPL-SCNC: 104 MMOL/L (ref 98–110)
CO2 SERPL-SCNC: 30 MMOL/L (ref 20–32)
CREAT SERPL-MCNC: 0.77 MG/DL (ref 0.7–1.3)
EGFRCR SERPLBLD CKD-EPI 2021: 104 ML/MIN/1.73M2
EST. AVERAGE GLUCOSE BLD GHB EST-MCNC: 128 MG/DL
EST. AVERAGE GLUCOSE BLD GHB EST-SCNC: 7.1 MMOL/L
FRUCTOSAMINE SERPL-SCNC: 315 UMOL/L (ref 205–285)
GLUCOSE SERPL-MCNC: 147 MG/DL (ref 65–99)
HBA1C MFR BLD: 6.1 %
POTASSIUM SERPL-SCNC: 4.2 MMOL/L (ref 3.5–5.3)
PROT SERPL-MCNC: 6.9 G/DL (ref 6.1–8.1)
SODIUM SERPL-SCNC: 140 MMOL/L (ref 135–146)

## 2025-06-24 DIAGNOSIS — E11.9 TYPE 2 DIABETES MELLITUS WITHOUT COMPLICATION, WITHOUT LONG-TERM CURRENT USE OF INSULIN: ICD-10-CM

## 2025-06-25 RX ORDER — BLOOD-GLUCOSE SENSOR
EACH MISCELLANEOUS
Qty: 6 EACH | Refills: 3 | Status: SHIPPED | OUTPATIENT
Start: 2025-06-25

## 2025-07-21 DIAGNOSIS — E11.9 TYPE 2 DIABETES MELLITUS WITHOUT COMPLICATION, WITHOUT LONG-TERM CURRENT USE OF INSULIN: ICD-10-CM

## 2025-07-21 RX ORDER — BLOOD-GLUCOSE SENSOR
EACH MISCELLANEOUS
Qty: 6 EACH | Refills: 3 | Status: SHIPPED | OUTPATIENT
Start: 2025-07-21

## 2025-08-04 ENCOUNTER — TELEMEDICINE CLINICAL SUPPORT (OUTPATIENT)
Dept: NUTRITION | Facility: HOSPITAL | Age: 60
End: 2025-08-04
Payer: COMMERCIAL

## 2025-08-04 DIAGNOSIS — E11.9 TYPE 2 DIABETES MELLITUS WITHOUT COMPLICATION, WITHOUT LONG-TERM CURRENT USE OF INSULIN: Primary | ICD-10-CM

## 2025-08-04 PROCEDURE — 97803 MED NUTRITION INDIV SUBSEQ: CPT | Mod: 95

## 2025-08-04 NOTE — PROGRESS NOTES
Nutrition: Follow-up     Reason for Nutrition Visit: Patient is a 58 y.o. male referred for T2DM. Referred on 11/26/24 by Dr. Rocky Orozco.     Nutrition Assessment    Food and Nutrient History:  Yonis Murrell presents virtually for nutrition counseling. Recent lab work revealed fructosamine of level of 315 umol/L suggestive of A1c of 7.0% which is closer to what his CGM has been reading. PCP has increased dose of Metformin. BG readings have been similar to last visit. He knows when he packs his meals for work, then better glycemic management. Also has started moving more including some weight training.     Dietary Considerations: keeps Kosher  Appetite: Normal  Food Allergy: none  Food Intolerance: some degree of lactose intolerance  GI Symptoms: none    Dentition: own  Cooking: Patient, Spouse/Significant Other  Grocery Shopping: Patient, Spouse/Significant Other  Dietary Supplements: none  Food Insecurity: Denies    24 hr recall:   B: omelet with 1 slice toast  L: sandwich with peach  Snack: peach   D: 2 morning star grillers with tomato, edwards, ketchup     Physical Activity: Has increased strength     Labs:  CMP Trend:    Recent Labs     05/09/25  0952 02/24/25  1735 05/14/24  0925 02/02/24  1523 11/03/23  0802 07/28/22  1451   GLUCOSE 147* 172* 162*   < > 234* 155*    136 138   < > 138 138   K 4.2 3.6 3.7   < > 4.0 4.1    100 102   < > 102 102   CO2 30 30 27   < > 29 26   ANIONGAP 6* 10 13   < > 11 14   BUN 14 13 16   < > 13 15   CREATININE 0.77 0.89 0.78   < > 0.68 0.78   EGFR 104 >90 >90   < > >90  --    CALCIUM 9.2 9.2 9.0   < > 9.5 9.8   ALBUMIN 4.4  --   --   --  4.2 4.4   ALKPHOS 61  --   --   --  96 85   PROT 6.9  --   --   --  6.7 7.3   AST 22  --   --   --  29 22   BILITOT 1.0  --   --   --  1.1 1.2   ALT 16  --   --   --  41 18    < > = values in this interval not displayed.      Lipid Panel trend:    Recent Labs     10/21/24  1016 11/03/23  0802 07/28/22  1451 07/22/21  1442  "07/16/20  1528 07/08/19  0954   CHOL 119 127 117   < > 102 120   HDL 31.9 28.1 29.6*   < > 28.2* 30.7*   LDLCALC 57 57  --   --   --   --    LDLF  --   --  25  --  34 44   VLDL 30 42* 62*  --  40 45*   TRIG 150* 208* 312*  --  201* 226*    < > = values in this interval not displayed.     Fructosamine 315 umol/L on 5/9/25  Normal c-peptide on 5/9/25      A1c  6.1% 5/9/25   6.0% 1/22/25  6.3% 9/30/24  6.4% 5/29/24  7.1% 02/27/24  8.5% 11/03/23  6.0% 07/28/22    Diabetes:  Diagnosed end of 2023  Prior Nutrition Education: Yes  SMBG: Freestyle Madeleine 3, data reviewed   Hypoglycemia: none  Current DM Medications/Insulin Regimen:  - Metformin XR 1000 mg BID                Nutrition Focused Physical Exam:  Performed/Deferred: Deferred as pt visually appears well-nourished with no signs of malnutrition    Anthropometrics:  Ht Readings from Last 1 Encounters:   02/12/24 1.651 m (5' 5\")     BMI Readings from Last 1 Encounters:   05/07/25 28.96 kg/m²     Wt Readings from Last 10 Encounters:   05/07/25 78.9 kg (174 lb)   04/01/25 80.3 kg (177 lb 0.5 oz)   02/24/25 78.3 kg (172 lb 9.9 oz)   01/29/25 80.7 kg (178 lb)   10/30/24 80.4 kg (177 lb 3.2 oz)   09/11/24 78 kg (172 lb)   05/29/24 76.7 kg (169 lb)   02/27/24 78.9 kg (174 lb)   01/26/24 78.9 kg (174 lb)   12/22/23 80.7 kg (178 lb)     Weight Change %:  Weight History / % Weight Change: No update since last visit      Estimated Needs:  Total Energy Estimated Needs in 24 hours (kCal): 1800 kCal; Method for Estimating Needs: 1546 x 1.3 - 250 (for weight loss)  Total Protein Estimated Needs in 24 Hours (g): 80 g; Method for Estimating 24 Hour Protein Needs: 1 g/kg   ;     ;                    Nutrition Diagnosis        Nutrition Diagnosis  Patient has Nutrition Diagnosis: Yes  Nutrition Diagnosis 1: Altered nutrition related to laboratory values  Related to (1): DM  As Evidenced by (1): Update: fructosamine 315 umol/L indicating A1c ~7.0% on 5/9/25, GMI = 7.1% (7/28/25 to " 8/4/25)       Nutrition Interventions/Recommendations   Nutrition Prescription: Oral nutrition increased carbohydrate awareness for DM management    Nutrition Interventions:   Food and Nutrient Delivery: Meals & Snacks: Carbohydrate-modified diet  Goals: Continue to read labels and menus for carbohydrates in mind     Coordination of Care: Goals: N/A     Nutrition Education:   Content related nutrition education  Discussed ongoing carbohydrate awareness and choosing high fiber carbohydrate sources.   Reviewed eating fruit after significant protein sources to help blunt spike in blood sugar.   Encouraged additional activity including a combination of aerobic and strength exercises.     Educational Handouts Provided: NA pt has material from prior visits      Nutrition Counseling:   Nutrition Counseling Strategies : Nutrition counseling based on motivational interviewing strategy, Nutrition counseling based on goal setting strategy    Patient Goals:    1) Patient will continue to focus on packing meals for work.  2) Patient will continue to increase movement, particularly near meal times.     Readiness to Change : Good  Level of Understanding : Good  Anticipated Compliant : Good         Nutrition Monitoring and Evaluation   Food and Nutrient Intake  Monitoring and Evaluation Plan: Meal/snack pattern  Meal/Snack Pattern: Estimated meal and snack pattern  Criteria: Monitor patient's progress towards meal and snack goal(s)              Biochemical Data, Medical Tests and Procedures  Monitoring and Evaluation Plan: Glucose/endocrine profile, Lipid profile  Glucose/Endocrine Profile: Hemoglobin A1c (HgbA1c)  Criteria: <7%  Lipid Profile: Triglycerides, serum  Criteria: TG < 150 mg/dL         Goal Status: Goal Status: Goal not achieved (Blood glucose management similar to last visit; no update on TG levels)         Follow Up: Follow-up as needed. Discussed with patient that new referral will need to be placed as current  referral expires in November. This provider will be on maternity leave from end of October to end of January. Instructed patient to call general scheduling line ([phone]) should he need a visit during that time frame.

## 2025-08-07 ENCOUNTER — APPOINTMENT (OUTPATIENT)
Dept: PRIMARY CARE | Facility: CLINIC | Age: 60
End: 2025-08-07
Payer: COMMERCIAL

## 2025-08-07 VITALS
WEIGHT: 175.44 LBS | TEMPERATURE: 97.1 F | DIASTOLIC BLOOD PRESSURE: 76 MMHG | BODY MASS INDEX: 29.19 KG/M2 | SYSTOLIC BLOOD PRESSURE: 116 MMHG | OXYGEN SATURATION: 97 % | HEART RATE: 79 BPM

## 2025-08-07 DIAGNOSIS — E11.9 TYPE 2 DIABETES MELLITUS WITHOUT COMPLICATION, WITHOUT LONG-TERM CURRENT USE OF INSULIN: ICD-10-CM

## 2025-08-07 PROCEDURE — 3078F DIAST BP <80 MM HG: CPT | Performed by: INTERNAL MEDICINE

## 2025-08-07 PROCEDURE — 99213 OFFICE O/P EST LOW 20 MIN: CPT | Performed by: INTERNAL MEDICINE

## 2025-08-07 PROCEDURE — 1036F TOBACCO NON-USER: CPT | Performed by: INTERNAL MEDICINE

## 2025-08-07 PROCEDURE — 3074F SYST BP LT 130 MM HG: CPT | Performed by: INTERNAL MEDICINE

## 2025-08-07 RX ORDER — METFORMIN HYDROCHLORIDE 500 MG/1
1000 TABLET, EXTENDED RELEASE ORAL
Qty: 360 TABLET | Refills: 3 | Status: SHIPPED | OUTPATIENT
Start: 2025-08-07 | End: 2026-08-07

## 2025-08-07 NOTE — PROGRESS NOTES
Subjective   Patient ID: Yonis Murrell is a 60 y.o. male who presents for Follow-up.    Past medical history includes diabetes, anxiety/adjustment disorder and MALA.    Most recently met with Char on 8/4/2025.  CGM reviewed, scanned in media tab.  GMI is 7% which is similar to his estimated A1c based on fructosamine.  Hemoglobin A1c both lab and POC have underestimated his average glucose.  We have also confirmed with comparing CGM and fingerstick glucose.  Continues on monotherapy metformin 1000 mg twice daily, increased from 500 mg 3 times daily last visit.        **COPIED FORWARD FOR REFERENCE**      , 3 kids doing well.   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  Oldest daughter in [place], second daughter  and living in [place], son going to [employer] [facility].  No EtOH, tobacco.     Providers:  GI-Dr. Frankel  Psychiatry-Dr. Jeremias Rodriguez, pyschologist is Dr. Katja Bates  Dermatology-Dr. Vieyra, not recently  Sleep - Dr. Murtaza Ladd  Urology - Dr. Fernandez    Objective   /76   Pulse 79   Temp 36.2 °C (97.1 °F)   Wt 79.6 kg (175 lb 7 oz)   SpO2 97%   BMI 29.19 kg/m²     Gen: NAD, pleasant, A&;Ox3  HEENT: PERRL, EOMI, MMM, OP clear  Neck: supple, no thyromegaly, no JVD, normal carotid upstroke  Pulm: lungs CTAB, good air movement  CV: RRR, no m/r/g, 2+ DP pulses  Abd: NABS, soft, NT, ND no HSM  Ext: no peripheral edema  Neuro: CN II-XII intact, no focal sensory or motor deficits, normal reflexes      Assessment/Plan     NIDDM: discrepancy between A1c and CGM readings. SMBG readings similar to CGM data as well as fructosamine; hemoglobin A1c (lab and POC) consistently underestimate glucose control, unclear cause  -ophthalmology 12/2024  -Needs monofilament testing  -Continue Metformin 1000mg BID   -Continue monitoring with Libre3  -Lipids never an issue but probably would start low dose given DM, defer to next visit, consider CT cardiac score to further guide goals of treatment    Elevated PSA/PI-RADS on  MRI prostate: Referred for prostate biopsy completed 4/1/25 with pathology showing benign prostate tissue, will follow up with Dr. CLINTON and monitor PSA annually     --------copied for reference---------      Low testosterone/BPH:    - Follows with Dr. Fernandez, continues on testosterone  - cont Flomax   - monitor PSA annually  - benign prostate biopsy, 4/1/2025     MALA/History of hypertension: Off meds, no hypertension since being on CPAP; using regularly, helps with sleep quality and energy, continue     Health maintenance  -Last colonoscopy: 12/3/2020, normal; repeat 5 years (hx of polyps)  - PSA: 3.43ng/mL, 10/21/2024  -Smoking history: Never  -Counseled regarding diet and exercise, patient has relatively good at home, discussed portion control, increase exercise  -Immunizations: current  -Followup in 3 months;  AWV: 10/30/2024     Subjective   Patient ID: Yonis Murrell is a 60 y.o. male who presents for Follow-up.      Objective   Physical Exam    /76   Pulse 79   Temp 36.2 °C (97.1 °F)   Wt 79.6 kg (175 lb 7 oz)   SpO2 97%   BMI 29.19 kg/m²        Assessment/Plan         Rocky Orozco MD

## 2025-08-11 ENCOUNTER — TELEPHONE (OUTPATIENT)
Dept: PRIMARY CARE | Facility: CLINIC | Age: 60
End: 2025-08-11
Payer: COMMERCIAL

## 2025-08-11 DIAGNOSIS — E11.65 TYPE 2 DIABETES MELLITUS WITH HYPERGLYCEMIA, WITHOUT LONG-TERM CURRENT USE OF INSULIN: Primary | ICD-10-CM

## 2025-08-14 RX ORDER — BLOOD-GLUCOSE SENSOR
EACH MISCELLANEOUS
Qty: 3 EACH | Refills: 3 | Status: SHIPPED | OUTPATIENT
Start: 2025-08-14

## 2025-11-06 ENCOUNTER — APPOINTMENT (OUTPATIENT)
Dept: PRIMARY CARE | Facility: CLINIC | Age: 60
End: 2025-11-06
Payer: COMMERCIAL

## (undated) DEVICE — NEEDLE, BIOPSY, MAX CORE, 18G

## (undated) DEVICE — SOLUTION, IRRIGATION, X RX SODIUM CHL 0.9%, 1000ML BTL

## (undated) DEVICE — DRESSING, TELFA, 3X4

## (undated) DEVICE — SYRINGE, TOOMEY, IRRIGATION, 60ML, INDIVIDUAL WRAP, STERILE

## (undated) DEVICE — BLANKET, LOWER BODY, VHA PLUS, ADULT

## (undated) DEVICE — GLOVE, SURGICAL, PROTEXIS PI , 7.5, PF, LF

## (undated) DEVICE — APPLICATOR, CHLORAPREP, W/ORANGE TINT, 26ML

## (undated) DEVICE — Device

## (undated) DEVICE — 20GA X 15CM, CHIBA-STYLE NEEDLE

## (undated) DEVICE — PERINEOLOGIC PRECISIONPOINT TRANSPERINEAL ACCESS, BIOPSY NEEDLE GUIDE (P1001)

## (undated) DEVICE — GOWN, SURGICAL, SIRUS, NON REINFORCED, LARGE